# Patient Record
Sex: MALE
[De-identification: names, ages, dates, MRNs, and addresses within clinical notes are randomized per-mention and may not be internally consistent; named-entity substitution may affect disease eponyms.]

---

## 2023-06-22 ENCOUNTER — APPOINTMENT (OUTPATIENT)
Dept: CARE COORDINATION | Facility: HOME HEALTH | Age: 68
End: 2023-06-22

## 2023-09-12 ENCOUNTER — APPOINTMENT (OUTPATIENT)
Dept: CARE COORDINATION | Facility: HOME HEALTH | Age: 68
End: 2023-09-12
Payer: MEDICARE

## 2023-09-12 VITALS
DIASTOLIC BLOOD PRESSURE: 83 MMHG | BODY MASS INDEX: 27.94 KG/M2 | SYSTOLIC BLOOD PRESSURE: 135 MMHG | HEIGHT: 61 IN | WEIGHT: 148 LBS

## 2023-09-12 PROCEDURE — 99345 HOME/RES VST NEW HIGH MDM 75: CPT | Mod: 95

## 2023-09-12 RX ORDER — KRILL/OM-3/DHA/EPA/PHOSPHO/AST 1000-230MG
81 CAPSULE ORAL DAILY
Qty: 1 | Refills: 0 | Status: ACTIVE | COMMUNITY
Start: 2023-09-12

## 2023-09-12 RX ORDER — CLOPIDOGREL BISULFATE 75 MG/1
75 TABLET, FILM COATED ORAL
Qty: 90 | Refills: 3 | Status: ACTIVE | COMMUNITY
Start: 2023-09-12

## 2023-09-12 RX ORDER — EZETIMIBE 10 MG/1
10 TABLET ORAL
Qty: 90 | Refills: 1 | Status: ACTIVE | COMMUNITY
Start: 2023-09-12

## 2024-01-04 ENCOUNTER — APPOINTMENT (OUTPATIENT)
Dept: HOME HEALTH SERVICES | Facility: HOME HEALTH | Age: 69
End: 2024-01-04
Payer: MEDICARE

## 2024-01-04 VITALS
RESPIRATION RATE: 98 BRPM | TEMPERATURE: 97.5 F | HEART RATE: 66 BPM | WEIGHT: 152 LBS | HEIGHT: 61 IN | BODY MASS INDEX: 28.7 KG/M2

## 2024-01-04 VITALS — SYSTOLIC BLOOD PRESSURE: 165 MMHG | DIASTOLIC BLOOD PRESSURE: 100 MMHG

## 2024-01-04 DIAGNOSIS — M20.009 UNSPECIFIED DEFORMITY OF UNSPECIFIED FINGER(S): ICD-10-CM

## 2024-01-04 DIAGNOSIS — Z78.9 OTHER SPECIFIED HEALTH STATUS: ICD-10-CM

## 2024-01-04 DIAGNOSIS — Z23 ENCOUNTER FOR IMMUNIZATION: ICD-10-CM

## 2024-01-04 PROCEDURE — 99344 HOME/RES VST NEW MOD MDM 60: CPT

## 2024-01-04 RX ORDER — LOSARTAN POTASSIUM 50 MG/1
50 TABLET, FILM COATED ORAL
Qty: 30 | Refills: 2 | Status: DISCONTINUED | COMMUNITY
Start: 2023-09-12 | End: 2024-01-03

## 2024-01-04 RX ORDER — OMEPRAZOLE 20 MG/1
20 CAPSULE, DELAYED RELEASE ORAL
Qty: 30 | Refills: 0 | Status: DISCONTINUED | COMMUNITY
Start: 2023-09-12 | End: 2024-01-04

## 2024-01-04 RX ORDER — METOPROLOL TARTRATE 75 MG/1
75 TABLET, FILM COATED ORAL
Refills: 0 | Status: DISCONTINUED | COMMUNITY
Start: 2023-09-12 | End: 2024-01-03

## 2024-01-04 RX ORDER — ROSUVASTATIN CALCIUM 40 MG/1
40 TABLET, FILM COATED ORAL DAILY
Qty: 90 | Refills: 0 | Status: ACTIVE | COMMUNITY
Start: 2024-01-04

## 2024-01-04 RX ORDER — ATORVASTATIN CALCIUM 40 MG/1
40 TABLET, FILM COATED ORAL
Qty: 1 | Refills: 3 | Status: DISCONTINUED | COMMUNITY
Start: 2023-09-12 | End: 2024-01-03

## 2024-01-04 NOTE — REASON FOR VISIT
[Initial Evaluation] : an initial evaluation [Spouse] : spouse [Other: ______] : provided by RAMA [Time Spent: ____ minutes] : Total time spent using  services: [unfilled] minutes. The patient's primary language is not English thus required  services. [Pre-Visit Preparation] : pre-visit preparation was done [FreeTextEntry1] : 68-year-old male with history of CAD, CKD on HD, GERD, HLD, HTN, open heart surgery 1/2023. [Interpreters_IDNumber] : [id number], [id number] [Interpreters_FullName] : Tee Malin [FreeTextEntry2] : chart review [TWNoteComboBox1] : [place]

## 2024-01-04 NOTE — REVIEW OF SYSTEMS
[Constipation] : constipation [Itching] : itching [Negative] : Eyes [Fever] : no fever [Earache] : no earache [Chest Pain] : no chest pain [Palpitations] : no palpitations [Shortness Of Breath] : no shortness of breath [Wheezing] : no wheezing [Cough] : no cough [Abdominal Pain] : no abdominal pain [Headache] : no headache [Depression] : no depression [FreeTextEntry4] : ? possible hearing loss, itchy ear on right [FreeTextEntry8] : makes very little urine, on HD [FreeTextEntry9] : Left foot pain, shooting/stabbing pain, wakes him up at night.

## 2024-01-04 NOTE — FAMILY HISTORY
[Family History Reviewed and Updated] : family history reviewed and updated [de-identified] : Diabetes in both brothers, HTN and heart in mother.

## 2024-01-04 NOTE — PHYSICAL EXAM
[No Acute Distress] : no acute distress [Normal Voice/Communication] : normal voice communication [Normal Sclera/Conjunctiva] : normal sclera/conjunctiva [EOMI] : extra ocular movement intact [Normal Oropharynx] : the oropharynx was normal [Normal TMs] : both tympanic membranes were normal [Supple] : the neck was supple [No LAD] : no lymphadenopathy [No Respiratory Distress] : no respiratory distress [Clear to Auscultation] : lungs were clear to auscultation bilaterally [No Accessory Muscle Use] : no accessory muscle use [Normal Rate] : heart rate was normal  [Regular Rhythm] : with a regular rhythm [No LE Varicosities] : there were no varicosital changes in the lower extremities [Pedal Pulses Present] : the pedal pulses are present [No Edema] : there was no peripheral edema [Thyroid Normal, No Nodules] : the thyroid was normal and there were no nodules present [Normal Bowel Sounds] : normal bowel sounds [Non Tender] : non-tender [Soft] : abdomen soft [No Masses] : no abdominal mass palpated [No Hernias] : no hernia was discovered [Normal Supraclavicular Nodes] : no supraclavicular lymphadenopathy [Normal Anterior Cervical Nodes] : no anterior cervical lymphadenopathy [No Gross Sensory Deficits] : no gross sensory deficits [Oriented x3] : oriented to person, place, and time [Acne] : no acne [de-identified] : R ear canal with flaking, mild tenderness/swelling and spot of blood near opening of ear canal, L ear canal pink [de-identified] : Loud blowing YANA, scars from bypass vein harvest on both calves [de-identified] : slightly slow gait, no joint swelling, R hand first and second digits locked in place, L hand 3, 4 and 5 digits locked in place [de-identified] : L great toenail is torn, purple, green. Toe tips are slightly red and shiny.  L great toe is as well.  NTTP, not warm. photo in pt chart [de-identified] : flattened affect, largely withdrawn during visit, wife answering most quetions, however when asked direct question and particularly if hand on shoulder or arm, he engages, laughing at times, making jokes

## 2024-01-04 NOTE — HISTORY OF PRESENT ILLNESS
[House Calls Co-Management Patient] : [unfilled] is a House Calls co-management patient [Patient is stable] : patient is stable [Patient] : patient [Spouse] : spouse [In-Place] : has aide services in-place [A] : A [LastPVisitDate] : 12/2023 [FreeTextEntry4] : Dr. Coleman Banegas [FreeTextEntry1] : impaired mobility [FreeTextEntry2] :  01/04/2024 COVID SCREEN: Patient or caretaker denies fever, cough, trouble breathing, rash, vomiting. Patient has not been in close contact with anyone who is COVID-19 positive, or suspected of having COVID-19.   N95 mask, gloves, eye wear and gown (if indicated) used during visit: Yes.   68-year-old male with history of CAD, CKD on HD, GERD, HLD, HTN, open heart surgery 1/2023, more confused and depressed since then, seen today for NPV and with c/o   CAD s/p open heart surgery 1/2023: On Plavix for another year. 1/19/23 - he is having a procedure on his valve, wonders about how dangerous this may be. He will get more after this. No CP.  Activity: He has to stop on every floor when going down the stairs.  Gets SOB. He has fingers on both hands that are locked in place.  CKD on HD: Three times a week - MWF. Has been on HD for 2 years. He gets swelling sometimes, but not very often. No SOB.  GERD: Taken off omeprazole because of interactions with plavix. No stomach pain currently  HLD: Switched from atorvastatin to rosuvastatin because cholesterol was not well controlled.  HTN: Metoprolol 75 mg.  He was taken off Losartan. No headaches or dizziness.  DM: No medication. Do not know what A1C is.  Hospitalizations/ED visits in past yr: 1/2023 for heart surgery - bypass, doctor called to have it done. Had catheter in June and October. Just to look around  Mobility: - no cane or wheelchair HHA does meals, clothes, bed, laundry   Falls: no in the past year.   Bowel movements: sometimes constipation no meds. no blood.  Sleep: He does not sleep well, no regular routine. Wakes up a lot and has trouble falling asleep.   Mood: Feels "tired". Doesn't eat much. [place] country[place] could walk outside there.  Here its harder because 5 stories up. Mood is up and down.  He is not sure if he could go for an extra walk before his surgery.  Pain: In left foot, on edges, sometimes gets stabbing/shooting pain.   Toenail: L great toenail is purple/green/deformed.  Pt states has been this way for one year. Wife says less than a month (she clips his nails, last was done before Christmas) Does not recall injury.  Social Hx: Smoking - no ETOH - sometimes Working in the past: Worked in a [employer], but it has been 8 years.  Stopped when they said he had diabetes. Current activities: If he walks and does something he feels better.  When he sits, he feels tired.   Preventive med: Flu shot - done by HD Colonoscopy - not done, had a stool lab at home in December.

## 2024-01-04 NOTE — HEALTH RISK ASSESSMENT
[Independent] : using telephone [Some assistance needed] : managing finances [No falls in past year] : Patient reported no falls in the past year [Yes] : The patient does have visual impairment [HRA Reviewed] : Health risk assessment reviewed [TimeGetUpGo] : 18

## 2024-01-09 ENCOUNTER — LABORATORY RESULT (OUTPATIENT)
Age: 69
End: 2024-01-09

## 2024-02-05 ENCOUNTER — NON-APPOINTMENT (OUTPATIENT)
Age: 69
End: 2024-02-05

## 2024-02-13 ENCOUNTER — APPOINTMENT (OUTPATIENT)
Dept: HOME HEALTH SERVICES | Facility: HOME HEALTH | Age: 69
End: 2024-02-13

## 2024-02-13 RX ORDER — VALSARTAN AND HYDROCHLOROTHIAZIDE 160; 25 MG/1; MG/1
160-25 TABLET, FILM COATED ORAL DAILY
Qty: 30 | Refills: 0 | Status: ACTIVE | COMMUNITY
Start: 2024-02-13

## 2024-02-13 RX ORDER — METOPROLOL SUCCINATE 50 MG/1
50 TABLET, EXTENDED RELEASE ORAL
Refills: 0 | Status: DISCONTINUED | COMMUNITY
Start: 2024-01-04 | End: 2024-02-08

## 2024-02-13 RX ORDER — VITAMIN B COMPLEX
CAPSULE ORAL
Qty: 30 | Refills: 0 | Status: ACTIVE | COMMUNITY
Start: 2024-02-13

## 2024-02-13 RX ORDER — AMLODIPINE BESYLATE 5 MG/1
5 TABLET ORAL DAILY
Qty: 90 | Refills: 1 | Status: DISCONTINUED | COMMUNITY
Start: 2023-09-12 | End: 2024-02-08

## 2024-02-29 ENCOUNTER — APPOINTMENT (OUTPATIENT)
Dept: HOME HEALTH SERVICES | Facility: HOME HEALTH | Age: 69
End: 2024-02-29
Payer: MEDICARE

## 2024-02-29 VITALS
DIASTOLIC BLOOD PRESSURE: 84 MMHG | OXYGEN SATURATION: 99 % | HEART RATE: 87 BPM | BODY MASS INDEX: 28.7 KG/M2 | WEIGHT: 152 LBS | HEIGHT: 61 IN | TEMPERATURE: 97.7 F | SYSTOLIC BLOOD PRESSURE: 156 MMHG | RESPIRATION RATE: 14 BRPM

## 2024-02-29 DIAGNOSIS — L60.8 OTHER NAIL DISORDERS: ICD-10-CM

## 2024-02-29 DIAGNOSIS — Z98.890 OTHER SPECIFIED POSTPROCEDURAL STATES: ICD-10-CM

## 2024-02-29 DIAGNOSIS — Z86.79 PERSONAL HISTORY OF OTHER DISEASES OF THE CIRCULATORY SYSTEM: ICD-10-CM

## 2024-02-29 PROCEDURE — 99349 HOME/RES VST EST MOD MDM 40: CPT

## 2024-03-01 PROBLEM — L60.8 TOENAIL DEFORMITY: Status: RESOLVED | Noted: 2024-01-04 | Resolved: 2024-03-01

## 2024-03-01 PROBLEM — Z86.79 HISTORY OF HEART VALVE ABNORMALITY: Status: RESOLVED | Noted: 2024-01-04 | Resolved: 2024-03-01

## 2024-03-01 PROBLEM — Z98.890 HISTORY OF HEART SURGERY: Status: ACTIVE | Noted: 2023-09-12

## 2024-03-01 NOTE — PHYSICAL EXAM
[No Acute Distress] : no acute distress [Normal Voice/Communication] : normal voice communication [Normal Sclera/Conjunctiva] : normal sclera/conjunctiva [EOMI] : extra ocular movement intact [Normal TMs] : both tympanic membranes were normal [Normal Oropharynx] : the oropharynx was normal [Supple] : the neck was supple [No LAD] : no lymphadenopathy [Thyroid Normal, No Nodules] : the thyroid was normal and there were no nodules present [Clear to Auscultation] : lungs were clear to auscultation bilaterally [No Respiratory Distress] : no respiratory distress [No Accessory Muscle Use] : no accessory muscle use [Normal Rate] : heart rate was normal  [Regular Rhythm] : with a regular rhythm [Pedal Pulses Present] : the pedal pulses are present [No LE Varicosities] : there were no varicosital changes in the lower extremities [No Edema] : there was no peripheral edema [Non Tender] : non-tender [Normal Bowel Sounds] : normal bowel sounds [No Masses] : no abdominal mass palpated [Soft] : abdomen soft [No Hernias] : no hernia was discovered [Normal Supraclavicular Nodes] : no supraclavicular lymphadenopathy [Normal Anterior Cervical Nodes] : no anterior cervical lymphadenopathy [Acne] : no acne [No Gross Sensory Deficits] : no gross sensory deficits [Oriented x3] : oriented to person, place, and time [de-identified] : R ear canal with mild flaking, NTTP, no swelling, ~.5 cm dried blood near opening of ear canal, L ear canal pink [de-identified] : slightly slow gait, no joint swelling, R hand first and second digits locked in place, L hand 3, 4 and 5 digits locked in place [de-identified] : scars from bypass vein harvest on both calves, S1, S2, ?S3 vs. fixed split S2 [de-identified] : L great toenail is torn, normal colors. [de-identified] : flattened affect, largely withdrawn during visit, wife answering most quetions, however when asked direct question and particularly if hand on shoulder or arm, he engages, laughing at times, making jokes

## 2024-03-01 NOTE — FAMILY HISTORY
[de-identified] : Diabetes in both brothers, HTN and heart in mother. [Family History Reviewed and Updated] : family history reviewed and updated

## 2024-03-01 NOTE — REASON FOR VISIT
[Initial Evaluation] : an initial evaluation [Spouse] : spouse [Other: ______] : provided by RAMA [Pre-Visit Preparation] : pre-visit preparation was done [FreeTextEntry1] : 68-year-old male with history of CAD, CKD on HD, GERD, HLD, HTN, open heart surgery 1/2023 and TAVR replacement 1/5/2024 [Interpreters_IDNumber] : [id number] [Interpreters_FullName] : Ramona [FreeTextEntry3] : cardiology [FreeTextEntry2] : chart review [TWNoteComboBox1] : [place]

## 2024-03-01 NOTE — COUNSELING
[Continue diet as tolerated] : continue diet as tolerated based on goals of care [Non - Smoker] : non-smoker [___] : [unfilled] [Date: ___] : diabetic screening completed on [unfilled] [] : foot exam [Improve mobility] : improve mobility [Decrease stress] : decrease stress [Improve pain control] : improve pain control [Patient/Caregiver is unclear of wishes] : patient/caregiver is unclear of wishes [Decrease hospital use] : decrease hospital use [Patient/Caregiver not ready to engage in discussion] : patient/caregiver not ready to engage in discussion [No Limitations] : Treatment Guidelines: No limitations [Full Code] : Code Status: Full Code [Long Term Intubation] : Intubation: Long term intubation

## 2024-03-01 NOTE — CHRONIC CARE ASSESSMENT
[Diabetic Diet] : diabetic [General Adherence] : and is generally adherent [PPS Score: ____] : Palliative Performance Scale (PPS) Score: [unfilled] [FreeTextEntry9] : Has PT 2x/wk

## 2024-03-01 NOTE — HISTORY OF PRESENT ILLNESS
[House Calls Co-Management Patient] : [unfilled] is a House Calls co-management patient [A] : A [Patient is stable] : patient is stable [In-Place] : has aide services in-place [LastPVisitDate] : 12/2023 [FreeTextEntry4] : Dr. Coleman Banegas [Patient] : patient [Spouse] : spouse [FreeTextEntry1] : impaired mobility [FreeTextEntry2] :  03/01/2024 COVID SCREEN:Patient or caretaker denies fever, cough, trouble breathing, rash, vomiting. Patient has not been in close contact with anyone who is COVID-19 positive, or suspected of having COVID-19.   N95 mask, gloves, eye wear and gown (if indicated) used during visit: Yes.        68-year-old male with history of CAD, CKD on HD, GERD, HLD, HTN, open heart surgery 1/2023, more confused and depressed since then, seen today for NPV and with c/o  Recent TAVR hospitalization TAVR 1/25/24. Dc from hospital 1/26. Saw heart doctor last Thursday, 2/22. All was good. Tired but otherwise ok. PT is coming today. Seeing Dr. Geiger next week. No SOB, No palpitations, no CP, no LH.  CKD on HD: Three times a week - MWF. they give Epo when he's there. Has labs with him today from 2/27 and they were very good. Albumin, Ca, Ph, PTH, K, iron sat all WNL. Hb was 10.2.  He was given vitamin B12 in the hospital and has been taking that.  Pain in feet: Anterior left shin and also left medial toe. Present for 2 months. Hasn't seen a foot doctor in awhile. +N/t in both feet. Shin Pain occurs when walking up stairs. Foot pain is more persistent. Tylenol helps.  GERD: Continues on H2 blocker. No heartburn pain.  HLD: On statin and ezetimibe.  HTN: Now on valsartan;HCTZ  DM: Last A1C 2/27 was 7.0  BM: Was rx senna but has stopped it. Not taking it now because he goes to the bathroom daily.   Toenail: No discoloration.  Social Hx: Smoking - no ETOH - sometimes Working in the past: Worked in a [employer], but it has been 8 years.  Stopped when they said he had diabetes. Current activities: If he walks and does something he feels better.  When he sits, he feels tired.   Preventive med: Flu shot - done by HD Colonoscopy - not done, had a stool lab at home in December.

## 2024-03-01 NOTE — DATA REVIEWED
[No studies available for review at this time.] : No studies available for review at this time. [FreeTextEntry1] : Wife has labs.  See HPI and A/P.

## 2024-03-01 NOTE — REVIEW OF SYSTEMS
[FreeTextEntry8] : makes very little urine, on HD [Fever] : no fever [Fatigue] : fatigue [Earache] : no earache [Chest Pain] : no chest pain [Shortness Of Breath] : no shortness of breath [Palpitations] : no palpitations [Wheezing] : no wheezing [Cough] : no cough [Abdominal Pain] : no abdominal pain [Constipation] : no constipation [Dysuria] : no dysuria [Headache] : no headache [Dizziness] : no dizziness [Itching] : itching [Depression] : no depression [Negative] : Eyes [FreeTextEntry4] : right ear itchy again this AM [FreeTextEntry9] : Left foot pain, shooting/stabbing pain, wakes him up at night. Also left shin pain, newer.

## 2024-03-01 NOTE — HEALTH RISK ASSESSMENT
[HRA Reviewed] : Health risk assessment reviewed [Independent] : using telephone [Some assistance needed] : managing finances [No falls in past year] : Patient reported no falls in the past year [Yes] : The patient does have visual impairment [TimeGetUpGo] : 18

## 2024-03-26 ENCOUNTER — APPOINTMENT (OUTPATIENT)
Dept: HOME HEALTH SERVICES | Facility: HOME HEALTH | Age: 69
End: 2024-03-26
Payer: MEDICARE

## 2024-03-26 VITALS
DIASTOLIC BLOOD PRESSURE: 64 MMHG | TEMPERATURE: 97.7 F | WEIGHT: 152 LBS | OXYGEN SATURATION: 97 % | SYSTOLIC BLOOD PRESSURE: 142 MMHG | HEART RATE: 80 BPM | HEIGHT: 61 IN | RESPIRATION RATE: 16 BRPM | BODY MASS INDEX: 28.7 KG/M2

## 2024-03-26 DIAGNOSIS — M79.662 PAIN IN LEFT LOWER LEG: ICD-10-CM

## 2024-03-26 DIAGNOSIS — Z95.2 PRESENCE OF PROSTHETIC HEART VALVE: ICD-10-CM

## 2024-03-26 DIAGNOSIS — M79.672 PAIN IN LEFT FOOT: ICD-10-CM

## 2024-03-26 PROCEDURE — 99349 HOME/RES VST EST MOD MDM 40: CPT

## 2024-03-26 RX ORDER — AMLODIPINE BESYLATE 5 MG/1
5 TABLET ORAL DAILY
Qty: 30 | Refills: 3 | Status: ACTIVE | COMMUNITY
Start: 2024-03-26

## 2024-03-26 RX ORDER — SEVELAMER HYDROCHLORIDE 800 MG/1
800 TABLET, FILM COATED ORAL 3 TIMES DAILY
Qty: 180 | Refills: 0 | Status: ACTIVE | COMMUNITY
Start: 2023-09-12

## 2024-03-26 RX ORDER — METOPROLOL SUCCINATE 25 MG/1
25 TABLET, EXTENDED RELEASE ORAL DAILY
Qty: 90 | Refills: 1 | Status: ACTIVE | COMMUNITY
Start: 2024-03-26

## 2024-03-26 RX ORDER — TRIAMCINOLONE ACETONIDE 0.25 MG/G
0.03 OINTMENT TOPICAL
Qty: 1 | Refills: 1 | Status: ACTIVE | COMMUNITY
Start: 2024-03-26 | End: 1900-01-01

## 2024-03-26 NOTE — FAMILY HISTORY
[Family History Reviewed and Updated] : family history reviewed and updated [de-identified] : Diabetes in both brothers, HTN and heart in mother.

## 2024-03-26 NOTE — HISTORY OF PRESENT ILLNESS
[House Calls Co-Management Patient] : [unfilled] is a House Calls co-management patient [Patient] : patient [Spouse] : spouse [LastPCPVisitDate] : next week [FreeTextEntry4] : Coleman Banegas [LastSpecialistVisitDate] : 3/21/24 [FreeTextEntry1] : impaired mobility [FreeTextEntry2] :  03/26/2024 COVID SCREEN:Patient or caretaker denies fever, cough, trouble breathing, rash, vomiting. Patient has not been in close contact with anyone who is COVID-19 positive, or suspected of having COVID-19.   N95 mask, gloves, eye wear and gown (if indicated) used during visit: Yes.    68-year-old male with history of CAD, CKD on HD, GERD, HLD, HTN, open heart surgery 1/2023, more confused and depressed since then, TAVR on 1/25/24, seen today for routine follow up and assessment of mood/memory.   PHQ-9 (completed with wife present): anhedonia - 2 sadness - 1 sleep - 3 energy - 1  food - 0 failure - 1 concentration - 2 movement - 0 suicidality - 0 Total = 10, moderate depression Not difficult at all. Has not discussed this with PCP before.  Recent TAVR hospitalization TAVR 1/25/24. Dc from hospital 1/26. Feeling better.  Breathing is better. Energy is a little better. Dr. Banegas started metoprolol and amlodipine for HTN. Saw him on 3/21. No SOB, No palpitations, no CP, no LH.  CKD on HD: Three times a week - MWF.  Mobility: Pt did not want to do therapy so they stopped coming. Last time they came was 2 weeks ago.  Pain in feet: Anterior left shin and also left medial toe. Hasn't seen a foot doctor yet. +N/t in both feet. Shin Pain worse when walking up stairs. Foot pain also continues.  Ear itching: Improved but still itchy sometimes. Using eye drops and these are helpful.  GERD: Continues on H2 blocker. No heartburn pain.  HLD: On statin and ezetimibe.  HTN: Now on valsartan;HCTZ as well as metoprolol and amlodipine  DM: Last A1C 2/27 was 7.0  BM: He goes to the bathroom daily.  Comfortable and soft.  Social Hx: Smoking - no ETOH - sometimes Working in the past: Worked in a [employer].   Preventive med: Flu shot - done by HD Colonoscopy - not done, had a stool lab at home in December.

## 2024-03-26 NOTE — REVIEW OF SYSTEMS
[Fatigue] : fatigue [Itching] : itching [Negative] : Eyes [Skin Rash] : skin rash [Insomnia] : insomnia [Fever] : no fever [Earache] : no earache [Chest Pain] : no chest pain [Palpitations] : no palpitations [Lower Ext Edema] : no lower extremity edema [Shortness Of Breath] : no shortness of breath [Wheezing] : no wheezing [Cough] : no cough [Abdominal Pain] : no abdominal pain [Constipation] : no constipation [Dysuria] : no dysuria [Headache] : no headache [Dizziness] : no dizziness [Suicidal] : not suicidal [Depression] : no depression [FreeTextEntry4] : right ear itchy again last week [FreeTextEntry9] : Left foot pain, shooting/stabbing pain, wakes him up at night. Also left shin pain, newer.

## 2024-03-26 NOTE — REASON FOR VISIT
[Initial Evaluation] : an initial evaluation [Follow-Up] : a follow-up visit [Spouse] : spouse [Other: ______] : provided by RAMA [Pre-Visit Preparation] : pre-visit preparation was done [Time Spent: ____ minutes] : Total time spent using  services: [unfilled] minutes. The patient's primary language is not English thus required  services. [FreeTextEntry1] : CAD, CKD on HD, GERD, HLD, HTN, open heart surgery 1/2023 and TAVR replacement 1/5/2024 [Interpreters_IDNumber] : [id number] [Interpreters_FullName] : Dylan Fish [FreeTextEntry2] : chart review [FreeTextEntry3] : cardiology [TWNoteComboBox1] : Egyptian

## 2024-03-26 NOTE — PHYSICAL EXAM
[Normal Oropharynx] : the oropharynx was normal [Thyroid Normal, No Nodules] : the thyroid was normal and there were no nodules present [No LAD] : no lymphadenopathy [Pedal Pulses Present] : the pedal pulses are present [No LE Varicosities] : there were no varicosital changes in the lower extremities [Normal Supraclavicular Nodes] : no supraclavicular lymphadenopathy [Normal Anterior Cervical Nodes] : no anterior cervical lymphadenopathy [de-identified] : L great toenail is torn, normal colors. [No Acute Distress] : no acute distress [Normal Voice/Communication] : normal voice communication [Normal Sclera/Conjunctiva] : normal sclera/conjunctiva [EOMI] : extra ocular movement intact [Normal TMs] : both tympanic membranes were normal [Supple] : the neck was supple [No Respiratory Distress] : no respiratory distress [Clear to Auscultation] : lungs were clear to auscultation bilaterally [No Accessory Muscle Use] : no accessory muscle use [Normal Rate] : heart rate was normal  [Regular Rhythm] : with a regular rhythm [No Edema] : there was no peripheral edema [Normal Bowel Sounds] : normal bowel sounds [Non Tender] : non-tender [Soft] : abdomen soft [No Masses] : no abdominal mass palpated [No Hernias] : no hernia was discovered [No Gross Sensory Deficits] : no gross sensory deficits [Oriented x3] : oriented to person, place, and time [Normal S1, S2] : normal S1 and S2 [Acne] : no acne [de-identified] : R ear canal with mild flaking, NTTP, no swelling, ~.3 cm dried blood near opening of ear canal, L ear canal pink with some flaking [de-identified] : scars from bypass vein harvest on both calves, palpable thrill on AV fistula of L arm [de-identified] : no joint swelling, R hand first and second digits locked in place, L hand 3, 4 and 5 digits locked in place [de-identified] : generally pleasant but some flattened affect, tearful when asked if he feels like a failure,

## 2024-03-26 NOTE — COUNSELING
[Continue diet as tolerated] : continue diet as tolerated based on goals of care [Non - Smoker] : non-smoker [___] : [unfilled] [Date: ___] : diabetic screening completed on [unfilled] [] : foot exam [Improve mobility] : improve mobility [Improve pain control] : improve pain control [Decrease stress] : decrease stress [Decrease hospital use] : decrease hospital use [Patient/Caregiver not ready to engage in discussion] : patient/caregiver not ready to engage in discussion [Patient/Caregiver is unclear of wishes] : patient/caregiver is unclear of wishes [Full Code] : Code Status: Full Code [No Limitations] : Treatment Guidelines: No limitations [Long Term Intubation] : Intubation: Long term intubation

## 2024-05-08 ENCOUNTER — APPOINTMENT (OUTPATIENT)
Dept: HOME HEALTH SERVICES | Facility: HOME HEALTH | Age: 69
End: 2024-05-08

## 2024-06-11 ENCOUNTER — APPOINTMENT (OUTPATIENT)
Dept: HOME HEALTH SERVICES | Facility: HOME HEALTH | Age: 69
End: 2024-06-11
Payer: MEDICARE

## 2024-06-11 VITALS
HEIGHT: 61 IN | WEIGHT: 152 LBS | HEART RATE: 81 BPM | RESPIRATION RATE: 18 BRPM | BODY MASS INDEX: 28.7 KG/M2 | OXYGEN SATURATION: 98 % | DIASTOLIC BLOOD PRESSURE: 50 MMHG | TEMPERATURE: 97.9 F | SYSTOLIC BLOOD PRESSURE: 120 MMHG

## 2024-06-11 DIAGNOSIS — H60.541 ACUTE ECZEMATOID OTITIS EXTERNA, RIGHT EAR: ICD-10-CM

## 2024-06-11 DIAGNOSIS — K59.00 CONSTIPATION, UNSPECIFIED: ICD-10-CM

## 2024-06-11 DIAGNOSIS — L30.9 DERMATITIS, UNSPECIFIED: ICD-10-CM

## 2024-06-11 DIAGNOSIS — R53.83 OTHER FATIGUE: ICD-10-CM

## 2024-06-11 DIAGNOSIS — E11.9 TYPE 2 DIABETES MELLITUS W/OUT COMPLICATIONS: ICD-10-CM

## 2024-06-11 DIAGNOSIS — R20.0 ANESTHESIA OF SKIN: ICD-10-CM

## 2024-06-11 DIAGNOSIS — Z99.2 END STAGE RENAL DISEASE: ICD-10-CM

## 2024-06-11 DIAGNOSIS — R20.2 ANESTHESIA OF SKIN: ICD-10-CM

## 2024-06-11 DIAGNOSIS — N18.6 END STAGE RENAL DISEASE: ICD-10-CM

## 2024-06-11 DIAGNOSIS — E53.8 DEFICIENCY OF OTHER SPECIFIED B GROUP VITAMINS: ICD-10-CM

## 2024-06-11 DIAGNOSIS — I25.10 ATHEROSCLEROTIC HEART DISEASE OF NATIVE CORONARY ARTERY W/OUT ANGINA PECTORIS: ICD-10-CM

## 2024-06-11 DIAGNOSIS — E78.5 HYPERLIPIDEMIA, UNSPECIFIED: ICD-10-CM

## 2024-06-11 DIAGNOSIS — K21.9 GASTRO-ESOPHAGEAL REFLUX DISEASE W/OUT ESOPHAGITIS: ICD-10-CM

## 2024-06-11 DIAGNOSIS — I10 ESSENTIAL (PRIMARY) HYPERTENSION: ICD-10-CM

## 2024-06-11 DIAGNOSIS — F32.1 MAJOR DEPRESSIVE DISORDER, SINGLE EPISODE, MODERATE: ICD-10-CM

## 2024-06-11 PROCEDURE — 99349 HOME/RES VST EST MOD MDM 40: CPT

## 2024-06-11 RX ORDER — FLUOCINOLONE ACETONIDE 0.11 MG/ML
0.01 OIL AURICULAR (OTIC) DAILY
Qty: 1 | Refills: 11 | Status: ACTIVE | COMMUNITY
Start: 2024-01-04 | End: 1900-01-01

## 2024-06-11 RX ORDER — FAMOTIDINE 20 MG/1
20 TABLET, FILM COATED ORAL DAILY
Qty: 30 | Refills: 0 | Status: DISCONTINUED | COMMUNITY
Start: 2024-01-04 | End: 2024-06-11

## 2024-06-11 RX ORDER — OMEPRAZOLE 20 MG/1
20 CAPSULE, DELAYED RELEASE ORAL
Qty: 30 | Refills: 11 | Status: ACTIVE | COMMUNITY
Start: 2024-06-11

## 2024-06-11 NOTE — HISTORY OF PRESENT ILLNESS
[House Calls Co-Management Patient] : [unfilled] is a House Calls co-management patient [Patient] : patient [Spouse] : spouse [LastPCPVisitDate] : next week [FreeTextEntry4] : Coleman Banegas [LastSpecialistVisitDate] : 3/21/24 [FreeTextEntry1] : impaired mobility [FreeTextEntry2] :  06/11/2024 COVID SCREEN:Patient or caretaker denies fever, cough, trouble breathing, rash, vomiting. Patient has not been in close contact with anyone who is COVID-19 positive, or suspected of having COVID-19.   N95 mask, gloves, eye wear and gown (if indicated) used during visit: Yes.    68-year-old male with history of CAD, CKD on HD, GERD, HLD, HTN, open heart surgery 1/2023, more confused and depressed since then, TAVR on 1/25/24, seen today for routine follow up and assessment of mood/memory.  No new medications. No falls.  - New: headache: More frequent this year. 2x/last week, hard to describe sensation, whole head, resolved after 2 tylenol and lying down. No dizziness, vision changes, CP, neuro sx with it. - Mood: Stable, still not sleeping well, PCP offered therapy but pt declined. Did not discuss meds. - Recent TAVR hospitalization:  TAVR 1/25/24. Dc from hospital 1/26. No SOB, No palpitations, no CP, no LH. - CKD on HD: Three times a week - MWF. - Pain in feet: Improved, still some dry skin but not as painful. No visit to podiatry. + n/t in both feet. - Ear itching: Improved but still itchy sometimes. Using eye drops and these are helpful. PCP checked R ear and scab was resolved, no concern.  - GERD: now taking PPI. - HLD: On statin and ezetimibe. -  HTN: Continues on valsartan;HCTZ as well as metoprolol and amlodipine. BP log notable for most /60-58, but one 131/64 and one 140/76.  Follow for now. -  DM: Last A1C 2/27 was 7.0 -  BM: daily.  Comfortable and soft.  Social Hx: Smoking - no ETOH - sometimes Working in the past: Worked in a [employer].   Preventive med: Flu shot - done by HD Colonoscopy - not done, had a stool lab at home in December.

## 2024-06-11 NOTE — FAMILY HISTORY
[Family History Reviewed and Updated] : family history reviewed and updated [de-identified] : Diabetes in both brothers, HTN and heart in mother.

## 2024-06-11 NOTE — REVIEW OF SYSTEMS
[Fatigue] : fatigue [Skin Rash] : skin rash [Headache] : headache [Insomnia] : insomnia [Depression] : depression [Negative] : Eyes [Fever] : no fever [Earache] : no earache [Chest Pain] : no chest pain [Palpitations] : no palpitations [Lower Ext Edema] : no lower extremity edema [Shortness Of Breath] : no shortness of breath [Wheezing] : no wheezing [Cough] : no cough [Abdominal Pain] : no abdominal pain [Constipation] : no constipation [Dysuria] : no dysuria [Dizziness] : no dizziness [Fainting] : no fainting [Suicidal] : not suicidal [FreeTextEntry4] : right ear itchy again last week [FreeTextEntry9] : Pain improved.

## 2024-06-11 NOTE — PHYSICAL EXAM
[No Acute Distress] : no acute distress [Normal Voice/Communication] : normal voice communication [Normal Sclera/Conjunctiva] : normal sclera/conjunctiva [EOMI] : extra ocular movement intact [Normal TMs] : both tympanic membranes were normal [Supple] : the neck was supple [No Respiratory Distress] : no respiratory distress [Clear to Auscultation] : lungs were clear to auscultation bilaterally [No Accessory Muscle Use] : no accessory muscle use [Normal Rate] : heart rate was normal  [Regular Rhythm] : with a regular rhythm [Normal S1, S2] : normal S1 and S2 [No Edema] : there was no peripheral edema [Normal Bowel Sounds] : normal bowel sounds [Non Tender] : non-tender [Soft] : abdomen soft [No Masses] : no abdominal mass palpated [No Hernias] : no hernia was discovered [No Gross Sensory Deficits] : no gross sensory deficits [Oriented x3] : oriented to person, place, and time [Acne] : no acne [de-identified] : R ear canal with mild flaking, NTTP, no swelling, L ear canal pink with some flaking [de-identified] : scars from bypass vein harvest on both calves [de-identified] : no joint swelling, R hand first and second digits locked in place, L hand 3, 4 and 5 digits locked in place [de-identified] : B/l shins with some hyperpigmentation, not as red or prominent as previous visits [de-identified] : generally pleasant but some flattened affect

## 2024-06-11 NOTE — DATA REVIEWED
[No studies available for review at this time.] : No studies available for review at this time. [FreeTextEntry1] : no new labs.

## 2024-06-11 NOTE — REASON FOR VISIT
[Follow-Up] : a follow-up visit [Spouse] : spouse [Other: ______] : provided by RAMA [Time Spent: ____ minutes] : Total time spent using  services: [unfilled] minutes. The patient's primary language is not English thus required  services. [Pre-Visit Preparation] : pre-visit preparation was done [FreeTextEntry1] : CAD, CKD on HD, GERD, HLD, HTN, open heart surgery 1/2023 and TAVR replacement 1/5/2024 [Interpreters_IDNumber] : [id number] [Interpreters_FullName] : Rosa Miller [FreeTextEntry2] : chart review [FreeTextEntry3] : cardiology [TWNoteComboBox1] : Norwegian

## 2024-06-13 ENCOUNTER — LABORATORY RESULT (OUTPATIENT)
Age: 69
End: 2024-06-13

## 2024-06-19 ENCOUNTER — NON-APPOINTMENT (OUTPATIENT)
Age: 69
End: 2024-06-19

## 2024-06-20 ENCOUNTER — NON-APPOINTMENT (OUTPATIENT)
Age: 69
End: 2024-06-20

## 2024-06-21 ENCOUNTER — NON-APPOINTMENT (OUTPATIENT)
Age: 69
End: 2024-06-21

## 2024-06-21 DIAGNOSIS — D64.9 ANEMIA, UNSPECIFIED: ICD-10-CM

## 2024-06-21 DIAGNOSIS — E55.9 VITAMIN D DEFICIENCY, UNSPECIFIED: ICD-10-CM

## 2024-06-21 LAB
25(OH)D3 SERPL-MCNC: 20.5 NG/ML
ALBUMIN SERPL ELPH-MCNC: 5 G/DL
ALP BLD-CCNC: 101 U/L
ALT SERPL-CCNC: 14 U/L
ANION GAP SERPL CALC-SCNC: 18 MMOL/L
AST SERPL-CCNC: 12 U/L
BASOPHILS # BLD AUTO: 0.06 K/UL
BASOPHILS NFR BLD AUTO: 0.7 %
BILIRUB SERPL-MCNC: 0.7 MG/DL
BUN SERPL-MCNC: 43 MG/DL
CALCIUM SERPL-MCNC: 10 MG/DL
CHLORIDE SERPL-SCNC: 93 MMOL/L
CHOLEST SERPL-MCNC: 92 MG/DL
CO2 SERPL-SCNC: 27 MMOL/L
CREAT SERPL-MCNC: 6.8 MG/DL
EGFR: 8 ML/MIN/1.73M2
EOSINOPHIL # BLD AUTO: 0.29 K/UL
EOSINOPHIL NFR BLD AUTO: 3.2 %
ESTIMATED AVERAGE GLUCOSE: 189 MG/DL
GLUCOSE SERPL-MCNC: 148 MG/DL
HBA1C MFR BLD HPLC: 8.2 %
HCT VFR BLD CALC: 40.4 %
HDLC SERPL-MCNC: 43 MG/DL
HGB BLD-MCNC: 12.9 G/DL
IMM GRANULOCYTES NFR BLD AUTO: 0.2 %
LDLC SERPL CALC-MCNC: 28 MG/DL
LYMPHOCYTES # BLD AUTO: 2.29 K/UL
LYMPHOCYTES NFR BLD AUTO: 25.4 %
MAN DIFF?: NORMAL
MCHC RBC-ENTMCNC: 29.7 PG
MCHC RBC-ENTMCNC: 31.9 GM/DL
MCV RBC AUTO: 92.9 FL
MONOCYTES # BLD AUTO: 0.89 K/UL
MONOCYTES NFR BLD AUTO: 9.9 %
NEUTROPHILS # BLD AUTO: 5.45 K/UL
NEUTROPHILS NFR BLD AUTO: 60.6 %
NONHDLC SERPL-MCNC: 49 MG/DL
PLATELET # BLD AUTO: 135 K/UL
POTASSIUM SERPL-SCNC: 5.3 MMOL/L
PROT SERPL-MCNC: 8.2 G/DL
RBC # BLD: 4.35 M/UL
RBC # FLD: 13.5 %
SODIUM SERPL-SCNC: 138 MMOL/L
TRIGL SERPL-MCNC: 113 MG/DL
TSH SERPL-ACNC: 3.98 UIU/ML
WBC # FLD AUTO: 9 K/UL

## 2024-06-21 RX ORDER — CHOLECALCIFEROL (VITAMIN D3) 1250 MCG
1.25 MG CAPSULE ORAL
Qty: 12 | Refills: 1 | Status: ACTIVE | COMMUNITY
Start: 2024-06-21 | End: 1900-01-01

## 2024-06-25 ENCOUNTER — LABORATORY RESULT (OUTPATIENT)
Age: 69
End: 2024-06-25

## 2024-06-26 ENCOUNTER — LABORATORY RESULT (OUTPATIENT)
Age: 69
End: 2024-06-26

## 2024-06-27 ENCOUNTER — LABORATORY RESULT (OUTPATIENT)
Age: 69
End: 2024-06-27

## 2024-07-01 LAB
BASOPHILS # BLD AUTO: 0.07 K/UL
BASOPHILS NFR BLD AUTO: 1 %
EOSINOPHIL # BLD AUTO: 0.21 K/UL
EOSINOPHIL NFR BLD AUTO: 2.9 %
FERRITIN SERPL-MCNC: 1430 NG/ML
HCT VFR BLD CALC: 33.1 %
HGB BLD-MCNC: 10.8 G/DL
IMM GRANULOCYTES NFR BLD AUTO: 0.3 %
IRON SATN MFR SERPL: 52 %
IRON SERPL-MCNC: 123 UG/DL
LYMPHOCYTES # BLD AUTO: 1.46 K/UL
LYMPHOCYTES NFR BLD AUTO: 20.2 %
MAN DIFF?: NORMAL
MCHC RBC-ENTMCNC: 30.5 PG
MCHC RBC-ENTMCNC: 32.6 GM/DL
MCV RBC AUTO: 93.5 FL
MONOCYTES # BLD AUTO: 0.63 K/UL
MONOCYTES NFR BLD AUTO: 8.7 %
NEUTROPHILS # BLD AUTO: 4.84 K/UL
NEUTROPHILS NFR BLD AUTO: 66.9 %
PLATELET # BLD AUTO: 122 K/UL
RBC # BLD: 3.54 M/UL
RBC # FLD: 12.9 %
TIBC SERPL-MCNC: 238 UG/DL
UIBC SERPL-MCNC: 115 UG/DL
WBC # FLD AUTO: 7.23 K/UL

## 2024-07-07 ENCOUNTER — NON-APPOINTMENT (OUTPATIENT)
Age: 69
End: 2024-07-07

## 2024-07-23 ENCOUNTER — NON-APPOINTMENT (OUTPATIENT)
Age: 69
End: 2024-07-23

## 2024-07-23 ENCOUNTER — TRANSCRIPTION ENCOUNTER (OUTPATIENT)
Age: 69
End: 2024-07-23

## 2024-07-23 ENCOUNTER — APPOINTMENT (OUTPATIENT)
Dept: HOME HEALTH SERVICES | Facility: HOME HEALTH | Age: 69
End: 2024-07-23
Payer: MEDICARE

## 2024-07-23 VITALS
WEIGHT: 152 LBS | HEART RATE: 84 BPM | DIASTOLIC BLOOD PRESSURE: 64 MMHG | OXYGEN SATURATION: 99 % | SYSTOLIC BLOOD PRESSURE: 110 MMHG | HEIGHT: 61 IN | BODY MASS INDEX: 28.7 KG/M2 | RESPIRATION RATE: 16 BRPM | TEMPERATURE: 97.9 F

## 2024-07-23 DIAGNOSIS — L30.9 DERMATITIS, UNSPECIFIED: ICD-10-CM

## 2024-07-23 DIAGNOSIS — E11.9 TYPE 2 DIABETES MELLITUS W/OUT COMPLICATIONS: ICD-10-CM

## 2024-07-23 DIAGNOSIS — N18.6 END STAGE RENAL DISEASE: ICD-10-CM

## 2024-07-23 DIAGNOSIS — R73.9 HYPERGLYCEMIA, UNSPECIFIED: ICD-10-CM

## 2024-07-23 DIAGNOSIS — I10 ESSENTIAL (PRIMARY) HYPERTENSION: ICD-10-CM

## 2024-07-23 DIAGNOSIS — Z99.2 END STAGE RENAL DISEASE: ICD-10-CM

## 2024-07-23 DIAGNOSIS — E55.9 VITAMIN D DEFICIENCY, UNSPECIFIED: ICD-10-CM

## 2024-07-23 DIAGNOSIS — I25.10 ATHEROSCLEROTIC HEART DISEASE OF NATIVE CORONARY ARTERY W/OUT ANGINA PECTORIS: ICD-10-CM

## 2024-07-23 DIAGNOSIS — R07.9 CHEST PAIN, UNSPECIFIED: ICD-10-CM

## 2024-07-23 DIAGNOSIS — K21.9 GASTRO-ESOPHAGEAL REFLUX DISEASE W/OUT ESOPHAGITIS: ICD-10-CM

## 2024-07-23 DIAGNOSIS — Z95.2 PRESENCE OF PROSTHETIC HEART VALVE: ICD-10-CM

## 2024-07-23 DIAGNOSIS — M79.662 PAIN IN LEFT LOWER LEG: ICD-10-CM

## 2024-07-23 DIAGNOSIS — R51.9 HEADACHE, UNSPECIFIED: ICD-10-CM

## 2024-07-23 DIAGNOSIS — H60.541 ACUTE ECZEMATOID OTITIS EXTERNA, RIGHT EAR: ICD-10-CM

## 2024-07-23 DIAGNOSIS — F32.1 MAJOR DEPRESSIVE DISORDER, SINGLE EPISODE, MODERATE: ICD-10-CM

## 2024-07-23 PROCEDURE — 99350 HOME/RES VST EST HIGH MDM 60: CPT

## 2024-07-23 RX ORDER — INSULIN GLARGINE 100 [IU]/ML
100 INJECTION, SOLUTION SUBCUTANEOUS
Qty: 1 | Refills: 0 | Status: ACTIVE | COMMUNITY
Start: 2024-07-23

## 2024-07-23 NOTE — FAMILY HISTORY
[Family History Reviewed and Updated] : family history reviewed and updated [de-identified] : Diabetes in both brothers, HTN and heart in mother.

## 2024-07-23 NOTE — PHYSICAL EXAM
[No Acute Distress] : no acute distress [Normal Voice/Communication] : normal voice communication [Normal Sclera/Conjunctiva] : normal sclera/conjunctiva [EOMI] : extra ocular movement intact [Normal TMs] : both tympanic membranes were normal [Supple] : the neck was supple [No Respiratory Distress] : no respiratory distress [Clear to Auscultation] : lungs were clear to auscultation bilaterally [No Accessory Muscle Use] : no accessory muscle use [Normal Rate] : heart rate was normal  [Regular Rhythm] : with a regular rhythm [Normal S1, S2] : normal S1 and S2 [No Edema] : there was no peripheral edema [Normal Bowel Sounds] : normal bowel sounds [Non Tender] : non-tender [Soft] : abdomen soft [No Masses] : no abdominal mass palpated [No Hernias] : no hernia was discovered [No Gross Sensory Deficits] : no gross sensory deficits [Oriented x3] : oriented to person, place, and time [Acne] : no acne [de-identified] : R ear canal WNL, NTTP, no swelling [de-identified] : scars from bypass vein harvest on both calves [de-identified] : no joint swelling, R hand first and second digits locked in place, L hand 3, 4 and 5 digits locked in place [de-identified] : L shin with some hyperpigmentation, not as red or prominent as previous visits [de-identified] : generally pleasant with some flattened affect

## 2024-07-23 NOTE — CHRONIC CARE ASSESSMENT
[Less than 3 Times Per Week] : exercises less than 3 times per week [General Adherence] : and is generally adherent [FreeTextEntry9] : PT

## 2024-07-23 NOTE — DATA REVIEWED
[No studies available for review at this time.] : No studies available for review at this time. [FreeTextEntry1] : A1C 8.2, Vit D 20, Hb 12-->10

## 2024-07-23 NOTE — REVIEW OF SYSTEMS
[Fatigue] : fatigue [Skin Rash] : skin rash [Headache] : headache [Insomnia] : insomnia [Depression] : depression [Negative] : Eyes [Chest Pain] : chest pain [Fever] : no fever [Earache] : no earache [Palpitations] : no palpitations [Lower Ext Edema] : no lower extremity edema [Shortness Of Breath] : no shortness of breath [Wheezing] : no wheezing [Cough] : no cough [Abdominal Pain] : no abdominal pain [Constipation] : no constipation [Dysuria] : no dysuria [Hematuria] : no hematuria [Dizziness] : no dizziness [Fainting] : no fainting [Suicidal] : not suicidal [FreeTextEntry9] : \

## 2024-07-23 NOTE — HISTORY OF PRESENT ILLNESS
[House Calls Co-Management Patient] : [unfilled] is a House Calls co-management patient [Patient] : patient [Spouse] : spouse [LastPCPVisitDate] : next week [FreeTextEntry4] : Coleman Banegas [LastSpecialistVisitDate] : 3/21/24 [FreeTextEntry1] : impaired mobility [FreeTextEntry2] : 7/23/2024 COVID SCREEN:Patient or caretaker denies fever, cough, trouble breathing, rash, vomiting. Patient has not been in close contact with anyone who is COVID-19 positive, or suspected of having COVID-19.   N95 mask, gloves, eye wear and gown (if indicated) used during visit: Yes.    68-year-old male with history of CAD, CKD on HD, GERD, HLD, HTN, open heart surgery 1/2023, more confused and depressed since then, TAVR on 1/25/24, seen today for acute visit for CP.  Interval events: - Chest pain: Occurred 2x on Sunday, 5/10 pain, pain with standing, just had coffee and bread and a cookie, no SOB, no dizziness, no sweating, no GI sx, no palpitations, not radiating, lasts 2-3 minutes. Never has had before. No reflux hx. - Also had headache same day: 5/10, lasted 1 hr then went away and came back a few times over 1 hr.  Last visit:  New: headache: More frequent this year. 2x/last week, hard to describe sensation, whole head, resolved after 2 tylenol and lying down. No dizziness, vision changes, CP, neuro sx with it. - GERD: Stopped omeprazole recommended by another doctor. Taking famotidine. Was on PPI x 2 months. Has been back on H2 blocker x 2 weeks.  Not sure why PPI was started, stopped because had old med -  HTN: Cardiology stopped valsartan;HCTZ and changed to losartan 25 mg on 7/17, continues metoprolol and amlodipine. BP log notable for /49-79 over past few weeks, more stable at /56-66 since 7/17.  HR in 70s-80s.  - Mood: Stable, still not sleeping well, PCP offered therapy but pt declined. Did not discuss meds. - CKD on HD: Three times a week - MWF. - Pain in feet: Improved, still some dry skin but not as painful. No visit to podiatry. + n/t in both feet. out of TAC, legs have been more itchy. - Ear itching: Improved, using drops - HLD: On statin and ezetimibe. -  DM: Last A1C 2/27 was 7.0 -  BM: daily.  Comfortable and soft.  Hospital: - TAVR hospitalization:  TAVR 1/25/24. Dc from hospital 1/26.  Social Hx: Smoking - no ETOH - sometimes Working in the past: Worked in a [employer].   Preventive med: Flu shot - done by HD Colonoscopy - not done, had a stool lab at home in December.

## 2024-07-23 NOTE — REASON FOR VISIT
[Acute] : an acute visit [Spouse] : spouse [Other: ______] : provided by RAMA [Time Spent: ____ minutes] : Total time spent using  services: [unfilled] minutes. The patient's primary language is not English thus required  services. [Pre-Visit Preparation] : pre-visit preparation was done [FreeTextEntry1] : chest pain [Interpreters_IDNumber] : [id number] [Interpreters_FullName] :  Teressa [FreeTextEntry2] : chart review [FreeTextEntry3] : cardiology [TWNoteComboBox1] : Namibian

## 2024-07-30 ENCOUNTER — NON-APPOINTMENT (OUTPATIENT)
Age: 69
End: 2024-07-30

## 2024-07-30 ENCOUNTER — APPOINTMENT (OUTPATIENT)
Dept: HOME HEALTH SERVICES | Facility: HOME HEALTH | Age: 69
End: 2024-07-30

## 2024-07-30 VITALS
RESPIRATION RATE: 15 BRPM | OXYGEN SATURATION: 98 % | DIASTOLIC BLOOD PRESSURE: 78 MMHG | SYSTOLIC BLOOD PRESSURE: 136 MMHG | TEMPERATURE: 98.1 F | HEART RATE: 82 BPM

## 2024-07-30 RX ORDER — LOSARTAN POTASSIUM 25 MG/1
25 TABLET, FILM COATED ORAL DAILY
Qty: 1 | Refills: 2 | Status: ACTIVE | COMMUNITY
Start: 2024-07-23

## 2024-07-31 RX ORDER — INSULIN ASPART 100 [IU]/ML
(70-30) 100 INJECTION, SUSPENSION SUBCUTANEOUS
Qty: 9 | Refills: 2 | Status: ACTIVE | COMMUNITY
Start: 2024-07-30 | End: 1900-01-01

## 2024-08-19 ENCOUNTER — APPOINTMENT (OUTPATIENT)
Dept: HOME HEALTH SERVICES | Facility: HOME HEALTH | Age: 69
End: 2024-08-19
Payer: MEDICARE

## 2024-08-19 VITALS
HEART RATE: 80 BPM | WEIGHT: 152 LBS | RESPIRATION RATE: 16 BRPM | HEIGHT: 61 IN | OXYGEN SATURATION: 97 % | SYSTOLIC BLOOD PRESSURE: 108 MMHG | DIASTOLIC BLOOD PRESSURE: 58 MMHG | BODY MASS INDEX: 28.7 KG/M2 | TEMPERATURE: 98.4 F

## 2024-08-19 DIAGNOSIS — R53.83 OTHER FATIGUE: ICD-10-CM

## 2024-08-19 DIAGNOSIS — F32.1 MAJOR DEPRESSIVE DISORDER, SINGLE EPISODE, MODERATE: ICD-10-CM

## 2024-08-19 DIAGNOSIS — R44.3 HALLUCINATIONS, UNSPECIFIED: ICD-10-CM

## 2024-08-19 DIAGNOSIS — R07.9 CHEST PAIN, UNSPECIFIED: ICD-10-CM

## 2024-08-19 DIAGNOSIS — L30.9 DERMATITIS, UNSPECIFIED: ICD-10-CM

## 2024-08-19 DIAGNOSIS — H60.541 ACUTE ECZEMATOID OTITIS EXTERNA, RIGHT EAR: ICD-10-CM

## 2024-08-19 DIAGNOSIS — E11.9 TYPE 2 DIABETES MELLITUS W/OUT COMPLICATIONS: ICD-10-CM

## 2024-08-19 DIAGNOSIS — R30.0 DYSURIA: ICD-10-CM

## 2024-08-19 DIAGNOSIS — R41.3 OTHER AMNESIA: ICD-10-CM

## 2024-08-19 DIAGNOSIS — Z99.2 END STAGE RENAL DISEASE: ICD-10-CM

## 2024-08-19 DIAGNOSIS — N18.6 END STAGE RENAL DISEASE: ICD-10-CM

## 2024-08-19 PROCEDURE — 99350 HOME/RES VST EST HIGH MDM 60: CPT

## 2024-08-19 NOTE — HISTORY OF PRESENT ILLNESS
[House Calls Co-Management Patient] : [unfilled] is a House Calls co-management patient [Patient] : patient [Spouse] : spouse [LastPCPVisitDate] : next week [FreeTextEntry4] : Coleman Banegas [LastSpecialistVisitDate] : 3/21/24 [FreeTextEntry1] : impaired mobility [FreeTextEntry2] : 8/19/2024 COVID SCREEN:Patient or caretaker denies fever, cough, trouble breathing, rash, vomiting. Patient has not been in close contact with anyone who is COVID-19 positive, or suspected of having COVID-19.   N95 mask, gloves, eye wear and gown (if indicated) used during visit: Yes.    68-year-old male with history of CAD, CKD on HD, GERD, HLD, HTN, open heart surgery 1/2023, more confused and depressed since then, TAVR on 1/25/24, seen today for acute visit.  8/19/24 CP: Has continued. Is happening 1-2x/wk, sometimes stronger but not bad, lasts for a couple minutes.  When it comes he walks and it goes away. Feels like a pinching/twisting. Appts with Banegas Sept 19, and Juanjo as walk in.  Off PPI and on famotidine.  Wife made change thinking they were the same. No SOB or LH. DM: A1C 8.2, per wife, pt has not been taking insulin for some time. Now she gives Novolog in morning, not taking Lantus at all.  Has not seen podiatry. Balaji n/t but does get pain in L foot. Memory: Pt not aware of what scars on his leg are from. Wife states from bypass surgery.  Concerned because last night he said he saw dead relatives, first time that has happened. Mood: Pt states fatigued, but not sad.  Declined psychology referral from PCP. Rash: dermatitis improves with TAC.  7/30/24 Interval events: - Chest pain: Occurred 2x on Sunday, 5/10 pain, pain with standing, just had coffee and bread and a cookie, no SOB, no dizziness, no sweating, no GI sx, no palpitations, not radiating, lasts 2-3 minutes. Never has had before. No reflux hx. - Also had headache same day: 5/10, lasted 1 hr then went away and came back a few times over 1 hr.  Last visit:  New: headache: More frequent this year. 2x/last week, hard to describe sensation, whole head, resolved after 2 tylenol and lying down. No dizziness, vision changes, CP, neuro sx with it. - GERD: Stopped omeprazole recommended by another doctor. Taking famotidine. Was on PPI x 2 months. Has been back on H2 blocker x 2 weeks.  Not sure why PPI was started, stopped because had old med -  HTN: Cardiology stopped valsartan;HCTZ and changed to losartan 25 mg on 7/17, continues metoprolol and amlodipine. BP log notable for /49-79 over past few weeks, more stable at /56-66 since 7/17.  HR in 70s-80s.  - Mood: Stable, still not sleeping well, PCP offered therapy but pt declined. Did not discuss meds. - CKD on HD: Three times a week - MWF. - Pain in feet: Improved, still some dry skin but not as painful. No visit to podiatry. + n/t in both feet. out of TAC, legs have been more itchy. - Ear itching: Improved, using drops - HLD: On statin and ezetimibe. -  DM: Last A1C 2/27 was 7.0 -  BM: daily.  Comfortable and soft.  Hospital: - TAVR hospitalization:  TAVR 1/25/24. Dc from hospital 1/26.  Social Hx: Smoking - no ETOH - sometimes Working in the past: Worked in a [employer].   Preventive med: Flu shot - done by HD Colonoscopy - not done, had a stool lab at home in December.

## 2024-08-19 NOTE — FAMILY HISTORY
[Family History Reviewed and Updated] : family history reviewed and updated [de-identified] : Diabetes in both brothers, HTN and heart in mother.

## 2024-08-19 NOTE — PHYSICAL EXAM
[No Acute Distress] : no acute distress [Normal Voice/Communication] : normal voice communication [Normal Sclera/Conjunctiva] : normal sclera/conjunctiva [EOMI] : extra ocular movement intact [Normal TMs] : both tympanic membranes were normal [Supple] : the neck was supple [No Respiratory Distress] : no respiratory distress [Clear to Auscultation] : lungs were clear to auscultation bilaterally [No Accessory Muscle Use] : no accessory muscle use [Normal Rate] : heart rate was normal  [Regular Rhythm] : with a regular rhythm [Normal S1, S2] : normal S1 and S2 [No Edema] : there was no peripheral edema [Normal Bowel Sounds] : normal bowel sounds [Non Tender] : non-tender [Soft] : abdomen soft [No Masses] : no abdominal mass palpated [No Hernias] : no hernia was discovered [No Gross Sensory Deficits] : no gross sensory deficits [Oriented x3] : oriented to person, place, and time [de-identified] : R ear canal WNL, NTTP, no swelling [Cranial Nerves Intact] : cranial nerves 2-12 were intact [No Motor Deficits] : the motor exam was normal [Normal Outer Ear/Nose] : the ears and nose were normal in appearance [Acne] : no acne [de-identified] : sitting calm, pleasant [de-identified] : scars from bypass vein harvest on both calves, pt unsure what these are from [de-identified] : no joint swelling, R hand first and second digits locked in place, L hand 3, 4 and 5 digits locked in place [de-identified] : L shin with some hyperpigmentation [de-identified] : generally pleasant with some flattened affect, 3 item recall WNL, CDT abnormal (see image)

## 2024-08-19 NOTE — REVIEW OF SYSTEMS
[Fatigue] : fatigue [Chest Pain] : chest pain [Skin Rash] : skin rash [Headache] : headache [Insomnia] : insomnia [Depression] : depression [Negative] : Eyes [Dysuria] : dysuria [Memory Loss] : memory loss [Fever] : no fever [Earache] : no earache [Palpitations] : no palpitations [Lower Ext Edema] : no lower extremity edema [Shortness Of Breath] : no shortness of breath [Wheezing] : no wheezing [Cough] : no cough [Abdominal Pain] : no abdominal pain [Constipation] : no constipation [Hematuria] : no hematuria [Dizziness] : no dizziness [Fainting] : no fainting [Suicidal] : not suicidal [FreeTextEntry8] : only slight amount of urine is made, occ dysuria [de-identified] : L shin [de-identified] : + hallucinations

## 2024-08-19 NOTE — FAMILY HISTORY
[Family History Reviewed and Updated] : family history reviewed and updated [de-identified] : Diabetes in both brothers, HTN and heart in mother.

## 2024-08-19 NOTE — REVIEW OF SYSTEMS
[Fatigue] : fatigue [Chest Pain] : chest pain [Skin Rash] : skin rash [Headache] : headache [Insomnia] : insomnia [Depression] : depression [Negative] : Eyes [Dysuria] : dysuria [Memory Loss] : memory loss [Fever] : no fever [Earache] : no earache [Palpitations] : no palpitations [Lower Ext Edema] : no lower extremity edema [Shortness Of Breath] : no shortness of breath [Wheezing] : no wheezing [Cough] : no cough [Abdominal Pain] : no abdominal pain [Constipation] : no constipation [Hematuria] : no hematuria [Dizziness] : no dizziness [Fainting] : no fainting [Suicidal] : not suicidal [FreeTextEntry8] : only slight amount of urine is made, occ dysuria [de-identified] : L shin [de-identified] : + hallucinations

## 2024-08-19 NOTE — PHYSICAL EXAM
[No Acute Distress] : no acute distress [Normal Voice/Communication] : normal voice communication [Normal Sclera/Conjunctiva] : normal sclera/conjunctiva [EOMI] : extra ocular movement intact [Normal TMs] : both tympanic membranes were normal [Supple] : the neck was supple [No Respiratory Distress] : no respiratory distress [Clear to Auscultation] : lungs were clear to auscultation bilaterally [No Accessory Muscle Use] : no accessory muscle use [Normal Rate] : heart rate was normal  [Regular Rhythm] : with a regular rhythm [Normal S1, S2] : normal S1 and S2 [No Edema] : there was no peripheral edema [Normal Bowel Sounds] : normal bowel sounds [Non Tender] : non-tender [Soft] : abdomen soft [No Masses] : no abdominal mass palpated [No Hernias] : no hernia was discovered [No Gross Sensory Deficits] : no gross sensory deficits [Oriented x3] : oriented to person, place, and time [de-identified] : R ear canal WNL, NTTP, no swelling [Cranial Nerves Intact] : cranial nerves 2-12 were intact [No Motor Deficits] : the motor exam was normal [Normal Outer Ear/Nose] : the ears and nose were normal in appearance [Acne] : no acne [de-identified] : sitting calm, pleasant [de-identified] : scars from bypass vein harvest on both calves, pt unsure what these are from [de-identified] : no joint swelling, R hand first and second digits locked in place, L hand 3, 4 and 5 digits locked in place [de-identified] : L shin with some hyperpigmentation [de-identified] : generally pleasant with some flattened affect, 3 item recall WNL, CDT abnormal (see image)

## 2024-08-19 NOTE — REASON FOR VISIT
[Acute] : an acute visit [Spouse] : spouse [Other: ______] : provided by RAMA [Pre-Visit Preparation] : pre-visit preparation was done [Time Spent: ____ minutes] : Total time spent using  services: [unfilled] minutes. The patient's primary language is not English thus required  services. [FreeTextEntry1] : chest pain, memory [Interpreters_IDNumber] : [id number] [Interpreters_FullName] : Giana [FreeTextEntry2] : chart review [FreeTextEntry3] : cardiology [TWNoteComboBox1] : Ugandan

## 2024-08-19 NOTE — HISTORY OF PRESENT ILLNESS
[House Calls Co-Management Patient] : [unfilled] is a House Calls co-management patient [Patient] : patient [Spouse] : spouse [LastPCPVisitDate] : next week [FreeTextEntry4] : Coleman Banegas [LastSpecialistVisitDate] : 3/21/24 [FreeTextEntry1] : impaired mobility [FreeTextEntry2] : 8/19/2024 COVID SCREEN:Patient or caretaker denies fever, cough, trouble breathing, rash, vomiting. Patient has not been in close contact with anyone who is COVID-19 positive, or suspected of having COVID-19.   N95 mask, gloves, eye wear and gown (if indicated) used during visit: Yes.    68-year-old male with history of CAD, CKD on HD, GERD, HLD, HTN, open heart surgery 1/2023, more confused and depressed since then, TAVR on 1/25/24, seen today for acute visit.  8/19/24 CP: Has continued. Is happening 1-2x/wk, sometimes stronger but not bad, lasts for a couple minutes.  When it comes he walks and it goes away. Feels like a pinching/twisting. Appts with Banegas Sept 19, and Juanjo as walk in.  Off PPI and on famotidine.  Wife made change thinking they were the same. No SOB or LH. DM: A1C 8.2, per wife, pt has not been taking insulin for some time. Now she gives Novolog in morning, not taking Lantus at all.  Has not seen podiatry. Balaji n/t but does get pain in L foot. Memory: Pt not aware of what scars on his leg are from. Wife states from bypass surgery.  Concerned because last night he said he saw dead relatives, first time that has happened. Mood: Pt states fatigued, but not sad.  Declined psychology referral from PCP. Rash: dermatitis improves with TAC.  7/30/24 Interval events: - Chest pain: Occurred 2x on Sunday, 5/10 pain, pain with standing, just had coffee and bread and a cookie, no SOB, no dizziness, no sweating, no GI sx, no palpitations, not radiating, lasts 2-3 minutes. Never has had before. No reflux hx. - Also had headache same day: 5/10, lasted 1 hr then went away and came back a few times over 1 hr.  Last visit:  New: headache: More frequent this year. 2x/last week, hard to describe sensation, whole head, resolved after 2 tylenol and lying down. No dizziness, vision changes, CP, neuro sx with it. - GERD: Stopped omeprazole recommended by another doctor. Taking famotidine. Was on PPI x 2 months. Has been back on H2 blocker x 2 weeks.  Not sure why PPI was started, stopped because had old med -  HTN: Cardiology stopped valsartan;HCTZ and changed to losartan 25 mg on 7/17, continues metoprolol and amlodipine. BP log notable for /49-79 over past few weeks, more stable at /56-66 since 7/17.  HR in 70s-80s.  - Mood: Stable, still not sleeping well, PCP offered therapy but pt declined. Did not discuss meds. - CKD on HD: Three times a week - MWF. - Pain in feet: Improved, still some dry skin but not as painful. No visit to podiatry. + n/t in both feet. out of TAC, legs have been more itchy. - Ear itching: Improved, using drops - HLD: On statin and ezetimibe. -  DM: Last A1C 2/27 was 7.0 -  BM: daily.  Comfortable and soft.  Hospital: - TAVR hospitalization:  TAVR 1/25/24. Dc from hospital 1/26.  Social Hx: Smoking - no ETOH - sometimes Working in the past: Worked in a [employer].   Preventive med: Flu shot - done by HD Colonoscopy - not done, had a stool lab at home in December.

## 2024-08-19 NOTE — REASON FOR VISIT
[Acute] : an acute visit [Spouse] : spouse [Other: ______] : provided by RAMA [Pre-Visit Preparation] : pre-visit preparation was done [Time Spent: ____ minutes] : Total time spent using  services: [unfilled] minutes. The patient's primary language is not English thus required  services. [FreeTextEntry1] : chest pain, memory [Interpreters_IDNumber] : [id number] [Interpreters_FullName] : Giana [FreeTextEntry2] : chart review [FreeTextEntry3] : cardiology [TWNoteComboBox1] : Ivorian

## 2024-08-21 ENCOUNTER — LABORATORY RESULT (OUTPATIENT)
Age: 69
End: 2024-08-21

## 2024-08-21 ENCOUNTER — NON-APPOINTMENT (OUTPATIENT)
Age: 69
End: 2024-08-21

## 2024-09-17 ENCOUNTER — NON-APPOINTMENT (OUTPATIENT)
Age: 69
End: 2024-09-17

## 2024-09-18 ENCOUNTER — APPOINTMENT (OUTPATIENT)
Dept: HOME HEALTH SERVICES | Facility: HOME HEALTH | Age: 69
End: 2024-09-18
Payer: MEDICARE

## 2024-09-18 VITALS
HEART RATE: 85 BPM | SYSTOLIC BLOOD PRESSURE: 138 MMHG | RESPIRATION RATE: 14 BRPM | TEMPERATURE: 97.5 F | DIASTOLIC BLOOD PRESSURE: 70 MMHG | OXYGEN SATURATION: 97 %

## 2024-09-18 DIAGNOSIS — I10 ESSENTIAL (PRIMARY) HYPERTENSION: ICD-10-CM

## 2024-09-18 DIAGNOSIS — Z87.898 PERSONAL HISTORY OF OTHER SPECIFIED CONDITIONS: ICD-10-CM

## 2024-09-18 DIAGNOSIS — E11.9 TYPE 2 DIABETES MELLITUS W/OUT COMPLICATIONS: ICD-10-CM

## 2024-09-18 DIAGNOSIS — Z86.39 PERSONAL HISTORY OF OTHER ENDOCRINE, NUTRITIONAL AND METABOLIC DISEASE: ICD-10-CM

## 2024-09-18 DIAGNOSIS — H60.541 ACUTE ECZEMATOID OTITIS EXTERNA, RIGHT EAR: ICD-10-CM

## 2024-09-18 DIAGNOSIS — F32.1 MAJOR DEPRESSIVE DISORDER, SINGLE EPISODE, MODERATE: ICD-10-CM

## 2024-09-18 DIAGNOSIS — R07.9 CHEST PAIN, UNSPECIFIED: ICD-10-CM

## 2024-09-18 DIAGNOSIS — R44.3 HALLUCINATIONS, UNSPECIFIED: ICD-10-CM

## 2024-09-18 DIAGNOSIS — M79.662 PAIN IN LEFT LOWER LEG: ICD-10-CM

## 2024-09-18 DIAGNOSIS — R41.3 OTHER AMNESIA: ICD-10-CM

## 2024-09-18 PROCEDURE — 99349 HOME/RES VST EST MOD MDM 40: CPT | Mod: 25

## 2024-09-18 NOTE — PHYSICAL EXAM
[No Acute Distress] : no acute distress [Normal Voice/Communication] : normal voice communication [Normal Sclera/Conjunctiva] : normal sclera/conjunctiva [EOMI] : extra ocular movement intact [Normal TMs] : both tympanic membranes were normal [Supple] : the neck was supple [No Respiratory Distress] : no respiratory distress [Clear to Auscultation] : lungs were clear to auscultation bilaterally [No Accessory Muscle Use] : no accessory muscle use [Normal Rate] : heart rate was normal  [Regular Rhythm] : with a regular rhythm [Normal S1, S2] : normal S1 and S2 [No Edema] : there was no peripheral edema [Normal Bowel Sounds] : normal bowel sounds [Non Tender] : non-tender [Soft] : abdomen soft [No Masses] : no abdominal mass palpated [No Hernias] : no hernia was discovered [No Gross Sensory Deficits] : no gross sensory deficits [Oriented x3] : oriented to person, place, and time [de-identified] : R ear canal WNL, NTTP, no swelling [Acne] : no acne [de-identified] : sitting on cough, engaged when spoken to directly, otherwise not participating much [de-identified] : scars from bypass vein harvest on both calves [de-identified] : no joint swelling, R hand first and second digits locked in place, L hand 3, 4 and 5 digits locked in place [de-identified] : L shin with improving hyperpigmentation, not as red or prominent as previous visits [de-identified] : generally pleasant with some flattened affect

## 2024-09-18 NOTE — FAMILY HISTORY
[Family History Reviewed and Updated] : family history reviewed and updated [de-identified] : Diabetes in both brothers, HTN and heart in mother.

## 2024-09-18 NOTE — END OF VISIT
[FreeTextEntry3] : Documented by Teressa Sepulveda acting as a scribe for Dr. Nolvia Howell. 09/18/2024   All medical record entries made by the Scribe were at my, Dr. Nolvia Howell, direction and personally dictated by me on 09/18/2024. I have reviewed the chart and agree that the record accurately reflects my personal performance of the history, physical exam, assessment and plan. I have also personally directed, reviewed, and agreed with the chart.

## 2024-09-18 NOTE — FAMILY HISTORY
[Family History Reviewed and Updated] : family history reviewed and updated [de-identified] : Diabetes in both brothers, HTN and heart in mother.

## 2024-09-18 NOTE — REASON FOR VISIT
[Acute] : an acute visit [Spouse] : spouse [Other: ______] : provided by RAMA [Pre-Visit Preparation] : pre-visit preparation was done [Time Spent: ____ minutes] : Total time spent using  services: [unfilled] minutes. The patient's primary language is not English thus required  services. [FreeTextEntry1] : chest pain, BG, hallucination [Interpreters_IDNumber] : [id number] [Interpreters_FullName] : Todd Segura [FreeTextEntry2] : chart review [FreeTextEntry3] : cardiology [TWNoteComboBox1] : Ugandan

## 2024-09-18 NOTE — HISTORY OF PRESENT ILLNESS
[House Calls Co-Management Patient] : [unfilled] is a House Calls co-management patient [Patient] : patient [Spouse] : spouse [LastPCPVisitDate] : next week [FreeTextEntry4] : Coleman Banegas [LastSpecialistVisitDate] : 3/21/24 [FreeTextEntry1] : impaired mobility [FreeTextEntry2] : 9/18/2024 COVID SCREEN:Patient or caretaker denies fever, cough, trouble breathing, rash, vomiting. Patient has not been in close contact with anyone who is COVID-19 positive, or suspected of having COVID-19.   N95 mask, gloves, eye wear and gown (if indicated) used during visit: Yes.    68-year-old male with history of CAD, CKD on HD, GERD, HLD, HTN, open heart surgery 1/2023, more confused and depressed since then, TAVR on 1/25/24, seen today for acute visit for CP.  9/18 CP not much changed with shift from famotidine to PPI. Sees cardiology tomorrow. BPs have been 130s-150s lately. BGs am before coffee, and after dinner around 8 pm, gives Novolog at 8 pm. AM BG , pm 182-241.  Endocrine - October 24 Hallucinations - when getting up, sitting at edge of bed sees people, but they do not last. Has been worse last few days, off and on for several months. Pt worries he's "going crazy." Unsure about meds for this. Rash on shin improved. Rash in ear stable. Labs from HD are good overall. OVerall feels well.  No SOB, stomach pain, constipation.  8/19 Interval events: - Chest pain: Occurred 2x on Sunday, 5/10 pain, pain with standing, just had coffee and bread and a cookie, no SOB, no dizziness, no sweating, no GI sx, no palpitations, not radiating, lasts 2-3 minutes. Never has had before. No reflux hx. - Also had headache same day: 5/10, lasted 1 hr then went away and came back a few times over 1 hr.  Last visit:  New: headache: More frequent this year. 2x/last week, hard to describe sensation, whole head, resolved after 2 tylenol and lying down. No dizziness, vision changes, CP, neuro sx with it. - GERD: Stopped omeprazole recommended by another doctor. Taking famotidine. Was on PPI x 2 months. Has been back on H2 blocker x 2 weeks.  Not sure why PPI was started, stopped because had old med -  HTN: Cardiology stopped valsartan;HCTZ and changed to losartan 25 mg on 7/17, continues metoprolol and amlodipine. BP log notable for /49-79 over past few weeks, more stable at /56-66 since 7/17.  HR in 70s-80s.  - Mood: Stable, still not sleeping well, PCP offered therapy but pt declined. Did not discuss meds. - CKD on HD: Three times a week - MWF. - Pain in feet: Improved, still some dry skin but not as painful. No visit to podiatry. + n/t in both feet. out of TAC, legs have been more itchy. - Ear itching: Improved, using drops - HLD: On statin and ezetimibe. -  DM: Last A1C 2/27 was 7.0 -  BM: daily.  Comfortable and soft.  Hospital: - TAVR hospitalization:  TAVR 1/25/24. Dc from hospital 1/26.  Social Hx: Smoking - no ETOH - sometimes Working in the past: Worked in a "[employer]   Preventive med: Flu shot - done by HD Colonoscopy - not done, had a stool lab at home in December.

## 2024-09-18 NOTE — PHYSICAL EXAM
[No Acute Distress] : no acute distress [Normal Voice/Communication] : normal voice communication [Normal Sclera/Conjunctiva] : normal sclera/conjunctiva [EOMI] : extra ocular movement intact [Normal TMs] : both tympanic membranes were normal [Supple] : the neck was supple [No Respiratory Distress] : no respiratory distress [Clear to Auscultation] : lungs were clear to auscultation bilaterally [No Accessory Muscle Use] : no accessory muscle use [Normal Rate] : heart rate was normal  [Regular Rhythm] : with a regular rhythm [Normal S1, S2] : normal S1 and S2 [No Edema] : there was no peripheral edema [Normal Bowel Sounds] : normal bowel sounds [Non Tender] : non-tender [Soft] : abdomen soft [No Masses] : no abdominal mass palpated [No Hernias] : no hernia was discovered [No Gross Sensory Deficits] : no gross sensory deficits [Oriented x3] : oriented to person, place, and time [de-identified] : R ear canal WNL, NTTP, no swelling [Acne] : no acne [de-identified] : sitting on cough, engaged when spoken to directly, otherwise not participating much [de-identified] : scars from bypass vein harvest on both calves [de-identified] : no joint swelling, R hand first and second digits locked in place, L hand 3, 4 and 5 digits locked in place [de-identified] : L shin with improving hyperpigmentation, not as red or prominent as previous visits [de-identified] : generally pleasant with some flattened affect

## 2024-09-18 NOTE — HEALTH RISK ASSESSMENT
Bedside and Verbal shift change report given to Samanta Vega RN (oncoming nurse) by Rasheed Mckeon RN (offgoing nurse). Report included the following information SBAR. SHIFT SUMMARY:            [address] NURSING NOTE   Admission Date 2/13/2018   Admission Diagnosis Sepsis ([address]  UTI (urinary tract infection)   Consults None      Cardiac Monitoring [x] Yes [] No      Purposeful Hourly Rounding [x] Yes    Alicia Score Total Score: 4   Alicia score 3 or > [x] Bed Alarm [] Avasys [] 1:1 sitter [] Patient refused (Place signed refusal form in chart)   Ronaldo Score Ronaldo Score: 16   Ronaldo score 14 or < [] PMT consult [] Wound Care consult    []  Specialty bed  [] Nutrition consult      Influenza Vaccine             Oxygen needs? [x] Room air Oxygen @  []1L    []2L    []3L   []4L    []5L   []6L     Use home O2? [] Yes [] No  Perform O2 challenge test using  smartphrase (.Homeoxygen)      Last bowel movement Last Bowel Movement Date: 02/14/18      Urinary Catheter             LDAs                                    Readmission Risk Assessment Tool Score Low Risk            10       Total Score        5 Pt. Coverage (Medicare=5 , Medicaid, or Self-Pay=4)    5 Charlson Comorbidity Score (Age + Comorbid Conditions)        Criteria that do not apply:    Has Seen PCP in Last 6 Months (Yes=3, No=0)    . Living with Significant Other. Assisted Living. LTAC. SNF.  or   Rehab    Patient Length of Stay (>5 days = 3)    IP Visits Last 12 Months (1-3=4, 4=9, >4=11)       Expected Length of Stay 4d 21h   Actual Length of Stay 2 [HRA Reviewed] : Health risk assessment reviewed [Independent] : using telephone [Some assistance needed] : managing finances [No falls in past year] : Patient reported no falls in the past year [Yes] : The patient does have visual impairment [TimeGetUpGo] : 18

## 2024-09-18 NOTE — HEALTH RISK ASSESSMENT
Patient came in and is very upset.  He stated that Dr. Rehman prescribed Pulmicort, and patient stated that he has explained that the medication does not work for him.  Please advise, Thank you    [HRA Reviewed] : Health risk assessment reviewed [Independent] : using telephone [Some assistance needed] : managing finances [No falls in past year] : Patient reported no falls in the past year [Yes] : The patient does have visual impairment [TimeGetUpGo] : 18

## 2024-09-18 NOTE — ADDENDUM
[FreeTextEntry1] : Documented by Teressa Sepulveda acting as a scribe under Dr. Nolvia Howell. 09/18/2024

## 2024-09-18 NOTE — REASON FOR VISIT
[Acute] : an acute visit [Spouse] : spouse [Other: ______] : provided by [facility] [Pre-Visit Preparation] : pre-visit preparation was done [Time Spent: ____ minutes] : Total time spent using  services: [unfilled] minutes. The patient's primary language is not English thus required  services. [FreeTextEntry1] : chest pain, BG, hallucination [Interpreters_IDNumber] : [id number] [Interpreters_FullName] : Todd Segura [FreeTextEntry2] : chart review [FreeTextEntry3] : cardiology [TWNoteComboBox1] : Bulgarian

## 2024-09-18 NOTE — HISTORY OF PRESENT ILLNESS
[House Calls Co-Management Patient] : [unfilled] is a House Calls co-management patient [Patient] : patient [Spouse] : spouse [LastPCPVisitDate] : next week [FreeTextEntry4] : Coleman Banegas [LastSpecialistVisitDate] : 3/21/24 [FreeTextEntry1] : impaired mobility [FreeTextEntry2] : 9/18/2024 COVID SCREEN:Patient or caretaker denies fever, cough, trouble breathing, rash, vomiting. Patient has not been in close contact with anyone who is COVID-19 positive, or suspected of having COVID-19.   N95 mask, gloves, eye wear and gown (if indicated) used during visit: Yes.    68-year-old male with history of CAD, CKD on HD, GERD, HLD, HTN, open heart surgery 1/2023, more confused and depressed since then, TAVR on 1/25/24, seen today for acute visit for CP.  9/18 CP not much changed with shift from famotidine to PPI. Sees cardiology tomorrow. BPs have been 130s-150s lately. BGs am before coffee, and after dinner around 8 pm, gives Novolog at 8 pm. AM BG , pm 182-241.  Endocrine - October 24 Hallucinations - when getting up, sitting at edge of bed sees people, but they do not last. Has been worse last few days, off and on for several months. Pt worries he's "going crazy." Unsure about meds for this. Rash on shin improved. Rash in ear stable. Labs from HD are good overall. OVerall feels well.  No SOB, stomach pain, constipation.  8/19 Interval events: - Chest pain: Occurred 2x on Sunday, 5/10 pain, pain with standing, just had coffee and bread and a cookie, no SOB, no dizziness, no sweating, no GI sx, no palpitations, not radiating, lasts 2-3 minutes. Never has had before. No reflux hx. - Also had headache same day: 5/10, lasted 1 hr then went away and came back a few times over 1 hr.  Last visit:  New: headache: More frequent this year. 2x/last week, hard to describe sensation, whole head, resolved after 2 tylenol and lying down. No dizziness, vision changes, CP, neuro sx with it. - GERD: Stopped omeprazole recommended by another doctor. Taking famotidine. Was on PPI x 2 months. Has been back on H2 blocker x 2 weeks.  Not sure why PPI was started, stopped because had old med -  HTN: Cardiology stopped valsartan;HCTZ and changed to losartan 25 mg on 7/17, continues metoprolol and amlodipine. BP log notable for /49-79 over past few weeks, more stable at /56-66 since 7/17.  HR in 70s-80s.  - Mood: Stable, still not sleeping well, PCP offered therapy but pt declined. Did not discuss meds. - CKD on HD: Three times a week - MWF. - Pain in feet: Improved, still some dry skin but not as painful. No visit to podiatry. + n/t in both feet. out of TAC, legs have been more itchy. - Ear itching: Improved, using drops - HLD: On statin and ezetimibe. -  DM: Last A1C 2/27 was 7.0 -  BM: daily.  Comfortable and soft.  Hospital: - TAVR hospitalization:  TAVR 1/25/24. Dc from hospital 1/26.  Social Hx: Smoking - no ETOH - sometimes Working in the past: Worked in a [employer].   Preventive med: Flu shot - done by HD Colonoscopy - not done, had a stool lab at home in December.

## 2024-09-18 NOTE — REVIEW OF SYSTEMS
[Fatigue] : fatigue [Chest Pain] : chest pain [Skin Rash] : skin rash [Headache] : headache [Insomnia] : insomnia [Depression] : depression [Negative] : Eyes [Fever] : no fever [Earache] : no earache [Palpitations] : no palpitations [Lower Ext Edema] : no lower extremity edema [Shortness Of Breath] : no shortness of breath [Wheezing] : no wheezing [Cough] : no cough [Abdominal Pain] : no abdominal pain [Constipation] : no constipation [Dysuria] : no dysuria [Hematuria] : no hematuria [Dizziness] : no dizziness [Fainting] : no fainting [Suicidal] : not suicidal

## 2024-10-30 ENCOUNTER — APPOINTMENT (OUTPATIENT)
Dept: HOME HEALTH SERVICES | Facility: HOME HEALTH | Age: 69
End: 2024-10-30

## 2024-12-10 ENCOUNTER — APPOINTMENT (OUTPATIENT)
Dept: HOME HEALTH SERVICES | Facility: HOME HEALTH | Age: 69
End: 2024-12-10
Payer: MEDICARE

## 2024-12-10 ENCOUNTER — APPOINTMENT (OUTPATIENT)
Dept: NEUROLOGY | Age: 69
End: 2024-12-10
Payer: MEDICARE

## 2024-12-10 ENCOUNTER — NON-APPOINTMENT (OUTPATIENT)
Age: 69
End: 2024-12-10

## 2024-12-10 VITALS
DIASTOLIC BLOOD PRESSURE: 84 MMHG | SYSTOLIC BLOOD PRESSURE: 146 MMHG | HEIGHT: 61 IN | HEART RATE: 82 BPM | TEMPERATURE: 97.8 F | RESPIRATION RATE: 17 BRPM | WEIGHT: 150 LBS | BODY MASS INDEX: 28.32 KG/M2

## 2024-12-10 VITALS
DIASTOLIC BLOOD PRESSURE: 70 MMHG | HEART RATE: 89 BPM | SYSTOLIC BLOOD PRESSURE: 132 MMHG | RESPIRATION RATE: 18 BRPM | OXYGEN SATURATION: 99 %

## 2024-12-10 DIAGNOSIS — R41.3 OTHER AMNESIA: ICD-10-CM

## 2024-12-10 DIAGNOSIS — E11.9 TYPE 2 DIABETES MELLITUS W/OUT COMPLICATIONS: ICD-10-CM

## 2024-12-10 DIAGNOSIS — Z99.2 END STAGE RENAL DISEASE: ICD-10-CM

## 2024-12-10 DIAGNOSIS — N18.6 END STAGE RENAL DISEASE: ICD-10-CM

## 2024-12-10 DIAGNOSIS — F03.90 UNSPECIFIED DEMENTIA W/OUT BEHAVIORAL DISTURBANCE: ICD-10-CM

## 2024-12-10 DIAGNOSIS — R44.3 HALLUCINATIONS, UNSPECIFIED: ICD-10-CM

## 2024-12-10 DIAGNOSIS — I10 ESSENTIAL (PRIMARY) HYPERTENSION: ICD-10-CM

## 2024-12-10 DIAGNOSIS — Z23 ENCOUNTER FOR IMMUNIZATION: ICD-10-CM

## 2024-12-10 DIAGNOSIS — Z87.2 PERSONAL HISTORY OF DISEASES OF THE SKIN AND SUBCUTANEOUS TISSUE: ICD-10-CM

## 2024-12-10 DIAGNOSIS — R07.9 CHEST PAIN, UNSPECIFIED: ICD-10-CM

## 2024-12-10 DIAGNOSIS — F32.1 MAJOR DEPRESSIVE DISORDER, SINGLE EPISODE, MODERATE: ICD-10-CM

## 2024-12-10 DIAGNOSIS — Z87.898 PERSONAL HISTORY OF OTHER SPECIFIED CONDITIONS: ICD-10-CM

## 2024-12-10 PROCEDURE — 99205 OFFICE O/P NEW HI 60 MIN: CPT

## 2024-12-10 PROCEDURE — 99349 HOME/RES VST EST MOD MDM 40: CPT

## 2024-12-10 PROCEDURE — G2211 COMPLEX E/M VISIT ADD ON: CPT

## 2024-12-10 RX ORDER — ISOSORBIDE MONONITRATE 10 MG/1
10 TABLET ORAL
Qty: 30 | Refills: 0 | Status: ACTIVE | COMMUNITY
Start: 2024-12-10

## 2024-12-26 ENCOUNTER — OUTPATIENT (OUTPATIENT)
Dept: OUTPATIENT SERVICES | Facility: HOSPITAL | Age: 69
LOS: 1 days | End: 2024-12-26
Payer: MEDICARE

## 2024-12-26 ENCOUNTER — APPOINTMENT (OUTPATIENT)
Dept: MRI IMAGING | Facility: HOSPITAL | Age: 69
End: 2024-12-26

## 2024-12-26 PROCEDURE — 70551 MRI BRAIN STEM W/O DYE: CPT | Mod: 26

## 2024-12-26 PROCEDURE — 70551 MRI BRAIN STEM W/O DYE: CPT

## 2025-01-13 ENCOUNTER — APPOINTMENT (OUTPATIENT)
Dept: NEUROLOGY | Facility: CLINIC | Age: 70
End: 2025-01-13

## 2025-01-14 ENCOUNTER — APPOINTMENT (OUTPATIENT)
Dept: NEUROLOGY | Facility: CLINIC | Age: 70
End: 2025-01-14

## 2025-01-28 ENCOUNTER — APPOINTMENT (OUTPATIENT)
Dept: NEUROLOGY | Facility: CLINIC | Age: 70
End: 2025-01-28
Payer: MEDICARE

## 2025-01-28 PROCEDURE — 95819 EEG AWAKE AND ASLEEP: CPT

## 2025-01-29 ENCOUNTER — APPOINTMENT (OUTPATIENT)
Dept: NEUROLOGY | Facility: CLINIC | Age: 70
End: 2025-01-29

## 2025-01-29 PROCEDURE — 95719 EEG PHYS/QHP EA INCR W/O VID: CPT

## 2025-01-29 PROCEDURE — 95700 EEG CONT REC W/VID EEG TECH: CPT

## 2025-01-29 PROCEDURE — 95708 EEG WO VID EA 12-26HR UNMNTR: CPT

## 2025-03-03 ENCOUNTER — NON-APPOINTMENT (OUTPATIENT)
Age: 70
End: 2025-03-03

## 2025-04-03 ENCOUNTER — APPOINTMENT (OUTPATIENT)
Dept: NEUROLOGY | Age: 70
End: 2025-04-03
Payer: MEDICARE

## 2025-04-03 ENCOUNTER — APPOINTMENT (OUTPATIENT)
Dept: HOME HEALTH SERVICES | Facility: HOME HEALTH | Age: 70
End: 2025-04-03
Payer: MEDICARE

## 2025-04-03 VITALS
HEART RATE: 80 BPM | TEMPERATURE: 97.8 F | WEIGHT: 150 LBS | DIASTOLIC BLOOD PRESSURE: 70 MMHG | OXYGEN SATURATION: 100 % | HEIGHT: 61 IN | BODY MASS INDEX: 28.32 KG/M2 | RESPIRATION RATE: 17 BRPM | SYSTOLIC BLOOD PRESSURE: 145 MMHG

## 2025-04-03 DIAGNOSIS — N18.6 END STAGE RENAL DISEASE: ICD-10-CM

## 2025-04-03 DIAGNOSIS — F03.90 UNSPECIFIED DEMENTIA W/OUT BEHAVIORAL DISTURBANCE: ICD-10-CM

## 2025-04-03 DIAGNOSIS — Z99.2 END STAGE RENAL DISEASE: ICD-10-CM

## 2025-04-03 DIAGNOSIS — E11.9 TYPE 2 DIABETES MELLITUS W/OUT COMPLICATIONS: ICD-10-CM

## 2025-04-03 DIAGNOSIS — Z95.2 PRESENCE OF PROSTHETIC HEART VALVE: ICD-10-CM

## 2025-04-03 DIAGNOSIS — I10 ESSENTIAL (PRIMARY) HYPERTENSION: ICD-10-CM

## 2025-04-03 DIAGNOSIS — Z87.898 PERSONAL HISTORY OF OTHER SPECIFIED CONDITIONS: ICD-10-CM

## 2025-04-03 DIAGNOSIS — G47.00 INSOMNIA, UNSPECIFIED: ICD-10-CM

## 2025-04-03 DIAGNOSIS — R07.9 CHEST PAIN, UNSPECIFIED: ICD-10-CM

## 2025-04-03 DIAGNOSIS — R44.1 VISUAL HALLUCINATIONS: ICD-10-CM

## 2025-04-03 DIAGNOSIS — F32.1 MAJOR DEPRESSIVE DISORDER, SINGLE EPISODE, MODERATE: ICD-10-CM

## 2025-04-03 DIAGNOSIS — R41.3 OTHER AMNESIA: ICD-10-CM

## 2025-04-03 PROCEDURE — 99215 OFFICE O/P EST HI 40 MIN: CPT | Mod: 25

## 2025-04-03 PROCEDURE — G2212 PROLONG OUTPT/OFFICE VIS: CPT

## 2025-04-03 PROCEDURE — 99350 HOME/RES VST EST HIGH MDM 60: CPT | Mod: 25

## 2025-04-04 VITALS
HEART RATE: 86 BPM | DIASTOLIC BLOOD PRESSURE: 74 MMHG | TEMPERATURE: 98.1 F | OXYGEN SATURATION: 99 % | RESPIRATION RATE: 18 BRPM | SYSTOLIC BLOOD PRESSURE: 120 MMHG

## 2025-04-04 PROBLEM — G47.00 INSOMNIA: Status: ACTIVE | Noted: 2025-04-03

## 2025-04-07 ENCOUNTER — APPOINTMENT (OUTPATIENT)
Dept: NEUROLOGY | Facility: CLINIC | Age: 70
End: 2025-04-07

## 2025-04-08 PROBLEM — R41.3 MEMORY CHANGES: Status: RESOLVED | Noted: 2024-08-19 | Resolved: 2025-04-08

## 2025-04-08 PROBLEM — Z87.898 HISTORY OF HALLUCINATIONS: Status: RESOLVED | Noted: 2024-08-19 | Resolved: 2025-04-08

## 2025-04-08 PROBLEM — R44.1 VISUAL HALLUCINATIONS: Status: ACTIVE | Noted: 2025-04-03

## 2025-04-08 PROBLEM — F03.90 DEMENTIA: Status: ACTIVE | Noted: 2025-04-03

## 2025-04-08 RX ORDER — ISOSORBIDE MONONITRATE 30 MG/1
30 TABLET, EXTENDED RELEASE ORAL
Qty: 30 | Refills: 0 | Status: ACTIVE | COMMUNITY
Start: 2025-03-31

## 2025-04-08 RX ORDER — ISOPRPOPYL ALCOHOL 70 ML/100ML
70 SWAB TOPICAL
Qty: 100 | Refills: 0 | Status: ACTIVE | COMMUNITY
Start: 2024-12-11

## 2025-04-08 RX ORDER — BLOOD SUGAR DIAGNOSTIC
STRIP MISCELLANEOUS
Qty: 100 | Refills: 0 | Status: ACTIVE | COMMUNITY
Start: 2024-12-11

## 2025-04-08 RX ORDER — PEN NEEDLE, DIABETIC 29 G X1/2"
31G X 5 MM NEEDLE, DISPOSABLE MISCELLANEOUS
Qty: 100 | Refills: 0 | Status: ACTIVE | COMMUNITY
Start: 2024-10-03

## 2025-04-08 RX ORDER — AMLODIPINE BESYLATE 10 MG/1
10 TABLET ORAL
Qty: 30 | Refills: 0 | Status: ACTIVE | COMMUNITY
Start: 2024-10-18

## 2025-04-08 RX ORDER — RANOLAZINE 500 MG/1
500 TABLET, EXTENDED RELEASE ORAL
Qty: 60 | Refills: 0 | Status: ACTIVE | COMMUNITY
Start: 2025-03-20

## 2025-04-08 RX ORDER — LANCETS 30 GAUGE
EACH MISCELLANEOUS
Qty: 100 | Refills: 0 | Status: ACTIVE | COMMUNITY
Start: 2024-12-11

## 2025-04-24 RX ORDER — LORAZEPAM 1 MG/1
1 TABLET ORAL
Qty: 2 | Refills: 0 | Status: ACTIVE | COMMUNITY
Start: 2025-04-24 | End: 1900-01-01

## 2025-04-25 ENCOUNTER — APPOINTMENT (OUTPATIENT)
Dept: SLEEP CENTER | Facility: HOSPITAL | Age: 70
End: 2025-04-25

## 2025-06-24 ENCOUNTER — APPOINTMENT (OUTPATIENT)
Dept: NEUROLOGY | Age: 70
End: 2025-06-24
Payer: MEDICARE

## 2025-06-24 VITALS
BODY MASS INDEX: 25.68 KG/M2 | SYSTOLIC BLOOD PRESSURE: 123 MMHG | DIASTOLIC BLOOD PRESSURE: 74 MMHG | HEIGHT: 61 IN | TEMPERATURE: 96.3 F | HEART RATE: 91 BPM | OXYGEN SATURATION: 96 % | WEIGHT: 136 LBS

## 2025-06-24 PROCEDURE — G2211 COMPLEX E/M VISIT ADD ON: CPT

## 2025-06-24 PROCEDURE — 99215 OFFICE O/P EST HI 40 MIN: CPT

## 2025-06-26 ENCOUNTER — APPOINTMENT (OUTPATIENT)
Dept: HOME HEALTH SERVICES | Facility: HOME HEALTH | Age: 70
End: 2025-06-26

## 2025-06-26 ENCOUNTER — NON-APPOINTMENT (OUTPATIENT)
Age: 70
End: 2025-06-26

## 2025-07-03 ENCOUNTER — APPOINTMENT (OUTPATIENT)
Dept: HOME HEALTH SERVICES | Facility: HOME HEALTH | Age: 70
End: 2025-07-03

## 2025-07-03 VITALS — SYSTOLIC BLOOD PRESSURE: 128 MMHG | HEART RATE: 102 BPM | DIASTOLIC BLOOD PRESSURE: 76 MMHG | TEMPERATURE: 97.2 F

## 2025-07-03 PROBLEM — J30.2 SEASONAL ALLERGIES: Status: ACTIVE | Noted: 2025-07-03

## 2025-07-03 PROCEDURE — 99497 ADVNCD CARE PLAN 30 MIN: CPT

## 2025-07-03 PROCEDURE — 99496 TRANSJ CARE MGMT HIGH F2F 7D: CPT | Mod: 25

## 2025-07-03 RX ORDER — FAMOTIDINE 20 MG/1
20 TABLET, FILM COATED ORAL
Refills: 0 | Status: ACTIVE | COMMUNITY

## 2025-07-03 RX ORDER — LORATADINE 10 MG/1
10 TABLET ORAL
Qty: 5 | Refills: 0 | Status: ACTIVE | COMMUNITY
Start: 2025-07-03 | End: 1900-01-01

## 2025-07-03 RX ORDER — POLYETHYLENE GLYCOL 3350 17 G/17G
17 POWDER, FOR SOLUTION ORAL DAILY
Qty: 30 | Refills: 3 | Status: ACTIVE | COMMUNITY
Start: 2025-07-03 | End: 1900-01-01

## 2025-07-03 RX ORDER — SENNOSIDES 8.6 MG/1
8.6 TABLET ORAL
Qty: 30 | Refills: 3 | Status: ACTIVE | COMMUNITY
Start: 2025-07-03 | End: 1900-01-01

## 2025-07-03 RX ORDER — SEVELAMER HYDROCHLORIDE 800 MG/1
800 TABLET, FILM COATED ORAL 3 TIMES DAILY
Refills: 0 | Status: ACTIVE | COMMUNITY

## 2025-07-08 PROBLEM — Z98.890 HISTORY OF HEART SURGERY: Status: RESOLVED | Noted: 2023-09-12 | Resolved: 2025-07-08

## 2025-07-08 PROBLEM — N18.5 ANEMIA DUE TO STAGE 5 CHRONIC KIDNEY DISEASE: Status: ACTIVE | Noted: 2025-07-08

## 2025-07-08 PROBLEM — G31.83 LEWY BODY DEMENTIA: Status: ACTIVE | Noted: 2025-06-24

## 2025-07-08 PROBLEM — M79.672 LEFT FOOT PAIN: Status: RESOLVED | Noted: 2024-03-01 | Resolved: 2025-07-08

## 2025-07-08 PROBLEM — Z87.898 HISTORY OF CHEST PAIN: Status: RESOLVED | Noted: 2024-07-23 | Resolved: 2025-07-08

## 2025-07-08 PROBLEM — R00.0 TACHYCARDIA: Status: ACTIVE | Noted: 2025-07-08

## 2025-07-08 PROBLEM — N18.6 CKD (CHRONIC KIDNEY DISEASE) REQUIRING CHRONIC DIALYSIS: Status: RESOLVED | Noted: 2023-09-12 | Resolved: 2025-07-08

## 2025-07-08 PROBLEM — E11.9 TYPE 2 DIABETES MELLITUS WITH INSULIN THERAPY: Status: ACTIVE | Noted: 2025-07-08

## 2025-07-08 PROBLEM — H60.541 ECZEMATOID OTITIS EXTERNA OF RIGHT EAR: Status: RESOLVED | Noted: 2024-01-04 | Resolved: 2025-07-08

## 2025-07-08 PROBLEM — Z71.89 ACP (ADVANCE CARE PLANNING): Status: ACTIVE | Noted: 2025-07-08

## 2025-07-08 PROBLEM — Z95.2 S/P TAVR (TRANSCATHETER AORTIC VALVE REPLACEMENT): Status: RESOLVED | Noted: 2024-03-01 | Resolved: 2025-07-08

## 2025-07-08 PROBLEM — R26.81 UNSTEADY GAIT: Status: ACTIVE | Noted: 2025-07-08

## 2025-07-10 ENCOUNTER — APPOINTMENT (OUTPATIENT)
Dept: NUCLEAR MEDICINE | Facility: HOSPITAL | Age: 70
End: 2025-07-10

## 2025-07-10 ENCOUNTER — OUTPATIENT (OUTPATIENT)
Dept: OUTPATIENT SERVICES | Facility: HOSPITAL | Age: 70
LOS: 1 days | End: 2025-07-10
Payer: MEDICARE

## 2025-07-10 PROCEDURE — 78803 RP LOCLZJ TUM SPECT 1 AREA: CPT | Mod: 26

## 2025-09-11 ENCOUNTER — APPOINTMENT (OUTPATIENT)
Dept: NEUROLOGY | Age: 70
End: 2025-09-11
Payer: MEDICARE

## 2025-09-11 VITALS
DIASTOLIC BLOOD PRESSURE: 61 MMHG | TEMPERATURE: 98 F | RESPIRATION RATE: 17 BRPM | BODY MASS INDEX: 26.62 KG/M2 | WEIGHT: 141 LBS | HEIGHT: 61 IN | OXYGEN SATURATION: 97 % | SYSTOLIC BLOOD PRESSURE: 100 MMHG | HEART RATE: 97 BPM

## 2025-09-11 PROCEDURE — 99215 OFFICE O/P EST HI 40 MIN: CPT

## 2025-09-11 PROCEDURE — G2211 COMPLEX E/M VISIT ADD ON: CPT

## 2025-09-16 ENCOUNTER — APPOINTMENT (OUTPATIENT)
Dept: HOME HEALTH SERVICES | Facility: HOME HEALTH | Age: 70
End: 2025-09-16
Payer: MEDICARE

## 2025-09-16 VITALS
DIASTOLIC BLOOD PRESSURE: 74 MMHG | SYSTOLIC BLOOD PRESSURE: 126 MMHG | OXYGEN SATURATION: 99 % | HEART RATE: 88 BPM | TEMPERATURE: 97.1 F

## 2025-09-16 DIAGNOSIS — F32.1 MAJOR DEPRESSIVE DISORDER, SINGLE EPISODE, MODERATE: ICD-10-CM

## 2025-09-16 DIAGNOSIS — I25.10 ATHEROSCLEROTIC HEART DISEASE OF NATIVE CORONARY ARTERY W/OUT ANGINA PECTORIS: ICD-10-CM

## 2025-09-16 DIAGNOSIS — Z82.49 FAMILY HISTORY OF ISCHEMIC HEART DISEASE AND OTHER DISEASES OF THE CIRCULATORY SYSTEM: ICD-10-CM

## 2025-09-16 DIAGNOSIS — Z83.3 FAMILY HISTORY OF DIABETES MELLITUS: ICD-10-CM

## 2025-09-16 DIAGNOSIS — Z79.4 TYPE 2 DIABETES MELLITUS W/OUT COMPLICATIONS: ICD-10-CM

## 2025-09-16 DIAGNOSIS — Z99.2 DEPENDENCE ON RENAL DIALYSIS: ICD-10-CM

## 2025-09-16 DIAGNOSIS — N18.5 CHRONIC KIDNEY DISEASE, STAGE 5: ICD-10-CM

## 2025-09-16 DIAGNOSIS — G31.83 DEMENTIA WITH LEWY BODIES: ICD-10-CM

## 2025-09-16 DIAGNOSIS — E11.9 TYPE 2 DIABETES MELLITUS W/OUT COMPLICATIONS: ICD-10-CM

## 2025-09-16 DIAGNOSIS — M25.50 PAIN IN UNSPECIFIED JOINT: ICD-10-CM

## 2025-09-16 DIAGNOSIS — D63.1 CHRONIC KIDNEY DISEASE, STAGE 5: ICD-10-CM

## 2025-09-16 DIAGNOSIS — L30.9 DERMATITIS, UNSPECIFIED: ICD-10-CM

## 2025-09-16 DIAGNOSIS — F03.90 UNSPECIFIED DEMENTIA W/OUT BEHAVIORAL DISTURBANCE: ICD-10-CM

## 2025-09-16 DIAGNOSIS — F02.80 DEMENTIA WITH LEWY BODIES: ICD-10-CM

## 2025-09-16 PROCEDURE — 99349 HOME/RES VST EST MOD MDM 40: CPT | Mod: 25

## 2025-09-16 RX ORDER — CLOTRIMAZOLE 1 G/100G
1 CREAM TOPICAL 3 TIMES DAILY
Qty: 3 | Refills: 3 | Status: ACTIVE | COMMUNITY
Start: 2025-09-16

## 2025-09-16 RX ORDER — DICLOFENAC SODIUM 10 MG/G
1 GEL TOPICAL DAILY
Qty: 1 | Refills: 3 | Status: ACTIVE | COMMUNITY
Start: 2025-09-16